# Patient Record
Sex: MALE | Race: WHITE
[De-identification: names, ages, dates, MRNs, and addresses within clinical notes are randomized per-mention and may not be internally consistent; named-entity substitution may affect disease eponyms.]

---

## 2020-01-02 LAB — HCT VFR BLD CALC: 23 % (ref 39.6–49)

## 2020-01-02 NOTE — XMS REPORT
Patient Summary Document

 Created on:2020



Patient:ELEONORA HILLMAN

Sex:Male

:1945

External Reference #:[id number]





Demographics







 Address  [address] 



   [place] [address]

 

 Home Phone  [phone]

 

 Email Address  N

 

 Preferred Language  Unknown

 

 Marital Status  Unknown

 

 Gnosticism Affiliation  Unknown

 

 Race  Unknown

 

 Additional Race(s)  Unavailable

 

 Ethnic Group  Unknown









Author







 Organization  [facility]

 

 Address  [address]



   [place] [address]

 

 Phone  [phone]









Care Team Providers







 Name  Role  Phone

 

 MARLEN LEÓN  Unavailable  Unavailable









Problems

This patient has no known problems.



Allergies, Adverse Reactions, Alerts

This patient has no known allergies or adverse reactions.



Medications

This patient has no known medications.



Results







 Test Description  Test Time  Test Comments  Text Results  Atomic Results  
Result Comments









 RAD, CHEST, 2  2019  Reason for Exam:->cad, htn,  FINAL REPORT PATIENT ID
:  



 VIEWS  08:28:00  tia, copd,  [id number] Chest, 2 views.  



     hypercholesterolemia  Clinical History: cad,  



       htn, tia, copd,  



       hypercholesterolemia  



       Comparison Study: 2016 Findings:  The  



       cardiac silhouette is  



       unremarkable. Increased  



       interstitial pulmonary  



       markings are seen. A 9 mm  



       nodular opacity is seen in  



       the right lower lung  



       field, stable from  



       previous and possibly a  



       nipple shadow. The pleural  



       spaces are clear.  



       Degenerative changes are  



       seen. Impression: No  



       significant change.  



       Signed: Elie Castro Verified  



       Date/Time:  2019  



       08:28:10 Reading Location:  



       [facility]  



       Reading Room - [place] [address]      Electronically  



       signed by: ELIE CASTRO M.D. on 2019 08:28  



       AM  









 CBC W/PLT COUNT & AUTO DIFFERENTIAL  2019 08:08:00    









   Test Item  Value  Reference Range  Comments









 WHITE BLOOD CELL COUNT (BEAKER) (test code=775)  11.4 K/ L  3.5-10.5  

 

 RED BLOOD CELL COUNT (BEAKER) (test code=761)  4.85 M/ L  4.63-6.08  

 

 HEMOGLOBIN (BEAKER) (test code=410)  13.3 GM/DL  13.7-17.5  

 

 HEMATOCRIT (BEAKER) (test code=411)  43.0 %  40.1-51.0  

 

 MEAN CORPUSCULAR VOLUME (BEAKER) (test code=753)  88.7 fL  79.0-92.2  

 

 MEAN CORPUSCULAR HEMOGLOBIN (BEAKER) (test code=751)  27.4 pg  25.7-32.2  

 

 MEAN CORPUSCULAR HEMOGLOBIN CONC (BEAKER) (test code=752)  30.9 GM/DL  32.3-
36.5  

 

 RED CELL DISTRIBUTION WIDTH (BEAKER) (test code=412)  18.0 %  11.6-14.4  

 

 PLATELET COUNT (BEAKER) (test code=756)  259 K/CU MM  150-450  

 

 MEAN PLATELET VOLUME (BEAKER) (test code=754)  10.3 fL  9.4-12.4  

 

 NUCLEATED RED BLOOD CELLS (BEAKER) (test code=413)  0 /100 WBC  0-0  

 

 NEUTROPHILS RELATIVE PERCENT (BEAKER) (test code=429)  74 %    

 

 LYMPHOCYTES RELATIVE PERCENT (BEAKER) (test code=430)  16 %    

 

 MONOCYTES RELATIVE PERCENT (BEAKER) (test code=431)  8 %    

 

 EOSINOPHILS RELATIVE PERCENT (BEAKER) (test code=432)  2 %    

 

 BASOPHILS RELATIVE PERCENT (BEAKER) (test code=437)  0 %    

 

 NEUTROPHILS ABSOLUTE COUNT (BEAKER) (test code=670)  8.48 K/ L  1.78-5.38  

 

 LYMPHOCYTES ABSOLUTE COUNT (BEAKER) (test code=414)  1.80 K/ L  1.32-3.57  

 

 MONOCYTES ABSOLUTE COUNT (BEAKER) (test code=415)  0.90 K/ L  0.30-0.82  

 

 EOSINOPHILS ABSOLUTE COUNT (BEAKER) (test code=416)  0.18 K/ L  0.04-0.54  

 

 BASOPHILS ABSOLUTE COUNT (BEAKER) (test code=417)  0.05 K/ L  0.01-0.08  

 

 IMMATURE GRANULOCYTES-RELATIVE PERCENT (BEAKER) (test  0 %  0-1  



 code=2801)      



LIPID [id number] 08:08:00





 Test Item  Value  Reference Range  Comments

 

 TRIGLYCERIDES (BEAKER) (test code=540)  143 mg/dL    

 

 CHOLESTEROL (BEAKER) (test code=631)  268 mg/dL    

 

 HDL CHOLESTEROL (BEAKER) (test code=976)  38 mg/dL    

 

 LDL CHOLESTEROL CALCULATED (BEAKER) (test  201 mg/dL    



 code=633)      



Triglyceride Reference Range:   Low Risk         &lt;150   Borderline    150-
199   High Risk     200-499   Very High Risk  &gt;=500Cholesterol Reference 
Range:   Low Risk         &lt;200   Borderline 200-239    High Risk        &gt;
240HDL Cholesterol Reference Range:   Low Risk         &gt;=60   High Risk     
    &lt;40LDL Cholesterol Reference Range:   Optimal          &lt;100   Near 
Optimal  100-129   Borderline    130-159   High          160-189   Very High   
    &gt;=190BASIC METABOLIC [id number] 08:08:00





 Test Item  Value  Reference Range  Comments

 

 SODIUM (BEAKER) (test  139 meq/L  136-145  



 yhwm=263)      

 

 POTASSIUM (BEAKER) (test  4.4 meq/L  3.5-5.1  



 code=379)      

 

 CHLORIDE (BEAKER) (test  104 meq/L    



 code=382)      

 

 CO2 (BEAKER) (test  25 meq/L  22-29  



 code=355)      

 

 BLOOD UREA NITROGEN  20 mg/dL  7-21  



 (BEAKER) (test code=354)      

 

 CREATININE (BEAKER) (test  1.53 mg/dL  0.57-1.25  



 code=358)      

 

 GLUCOSE RANDOM (BEAKER)  114 mg/dL    



 (test code=652)      

 

 CALCIUM (BEAKER) (test  9.1 mg/dL  8.4-10.2  



 code=697)      

 

 EGFR (BEAKER) (test  45 mL/min/1.73 sq m    ESTIMATED GFR IS NOT AS



 code=1092)      ACCURATE AS CREATININE



       CLEARANCE IN PREDICTING



       GLOMERULAR FILTRATION



       RATE. ESTIMATED GFR IS



       NOT APPLICABLE FOR



       DIALYSIS PATIENTS.



HEPATIC FUNCTION [id number] 08:08:00





 Test Item  Value  Reference Range  Comments

 

 TOTAL PROTEIN (BEAKER) (test code=770)  7.7 gm/dL  6.0-8.3  

 

 ALBUMIN (BEAKER) (test code=1145)  3.8 g/dL  3.5-5.0  

 

 BILIRUBIN TOTAL (BEAKER) (test code=377)  0.5 mg/dL  0.2-1.2  

 

 BILIRUBIN DIRECT (BEAKER) (test code=706)  0.2 mg/dL  0.1-0.5  

 

 ALKALINE PHOSPHATASE (BEAKER) (test code=346)  116 U/L    

 

 AST (SGOT) (BEAKER) (test code=353)  18 U/L  5-34  

 

 ALT (SGPT) (BEAKER) (test code=347)  15 U/L  6-55

## 2020-01-03 ENCOUNTER — HOSPITAL ENCOUNTER (OUTPATIENT)
Dept: HOSPITAL 97 - DS | Age: 75
Discharge: HOME | End: 2020-01-03
Attending: INTERNAL MEDICINE
Payer: COMMERCIAL

## 2020-01-03 VITALS — OXYGEN SATURATION: 96 % | DIASTOLIC BLOOD PRESSURE: 70 MMHG | SYSTOLIC BLOOD PRESSURE: 162 MMHG

## 2020-01-03 VITALS — BODY MASS INDEX: 25.8 KG/M2

## 2020-01-03 VITALS — TEMPERATURE: 99.1 F

## 2020-01-03 DIAGNOSIS — R53.83: ICD-10-CM

## 2020-01-03 DIAGNOSIS — D50.9: Primary | ICD-10-CM

## 2020-01-03 LAB — HCT VFR BLD CALC: 28.7 % (ref 39.6–49)

## 2020-01-03 PROCEDURE — 36415 COLL VENOUS BLD VENIPUNCTURE: CPT

## 2020-01-03 PROCEDURE — 85018 HEMOGLOBIN: CPT

## 2020-01-03 PROCEDURE — 86901 BLOOD TYPING SEROLOGIC RH(D): CPT

## 2020-01-03 PROCEDURE — 36430 TRANSFUSION BLD/BLD COMPNT: CPT

## 2020-01-03 PROCEDURE — 85014 HEMATOCRIT: CPT

## 2020-01-03 PROCEDURE — 86850 RBC ANTIBODY SCREEN: CPT

## 2020-01-03 PROCEDURE — 86900 BLOOD TYPING SEROLOGIC ABO: CPT

## 2020-01-03 NOTE — XMS REPORT
Patient Summary Document

 Created on:January 3, 2020



Patient:ELEONORA HILLMAN

Sex:Male

:1945

External Reference #:[id number]





Demographics







 Address  [address] 



   [place] [address]

 

 Home Phone  [phone]

 

 Email Address  N

 

 Preferred Language  Unknown

 

 Marital Status  Unknown

 

 Sabianist Affiliation  Unknown

 

 Race  Unknown

 

 Additional Race(s)  Unavailable

 

 Ethnic Group  Unknown









Author







 Organization  [facility]

 

 Address  [address]



   [place] [address]

 

 Phone  [phone]









Care Team Providers







 Name  Role  Phone

 

 MARLEN LEÓN  Unavailable  Unavailable









Problems

This patient has no known problems.



Allergies, Adverse Reactions, Alerts

This patient has no known allergies or adverse reactions.



Medications

This patient has no known medications.



Results







 Test Description  Test Time  Test Comments  Text Results  Atomic Results  
Result Comments









 RAD, CHEST, 2  2019  Reason for Exam:->cad, htn,  FINAL REPORT PATIENT ID
:  



 VIEWS  08:28:00  tia, copd,  [id number] Chest, 2 views.  



     hypercholesterolemia  Clinical History: cad,  



       htn, tia, copd,  



       hypercholesterolemia  



       Comparison Study: 2016 Findings:  The  



       cardiac silhouette is  



       unremarkable. Increased  



       interstitial pulmonary  



       markings are seen. A 9 mm  



       nodular opacity is seen in  



       the right lower lung  



       field, stable from  



       previous and possibly a  



       nipple shadow. The pleural  



       spaces are clear.  



       Degenerative changes are  



       seen. Impression: No  



       significant change.  



       Signed: Elie Castro Verified  



       Date/Time:  2019  



       08:28:10 Reading Location:  



       [facility]  



       Reading Room - [place] [address]      Electronically  



       signed by: ELIE CASTRO M.D. on 2019 08:28  



       AM  









 CBC W/PLT COUNT & AUTO DIFFERENTIAL  2019 08:08:00    









   Test Item  Value  Reference Range  Comments









 WHITE BLOOD CELL COUNT (BEAKER) (test code=775)  11.4 K/ L  3.5-10.5  

 

 RED BLOOD CELL COUNT (BEAKER) (test code=761)  4.85 M/ L  4.63-6.08  

 

 HEMOGLOBIN (BEAKER) (test code=410)  13.3 GM/DL  13.7-17.5  

 

 HEMATOCRIT (BEAKER) (test code=411)  43.0 %  40.1-51.0  

 

 MEAN CORPUSCULAR VOLUME (BEAKER) (test code=753)  88.7 fL  79.0-92.2  

 

 MEAN CORPUSCULAR HEMOGLOBIN (BEAKER) (test code=751)  27.4 pg  25.7-32.2  

 

 MEAN CORPUSCULAR HEMOGLOBIN CONC (BEAKER) (test code=752)  30.9 GM/DL  32.3-
36.5  

 

 RED CELL DISTRIBUTION WIDTH (BEAKER) (test code=412)  18.0 %  11.6-14.4  

 

 PLATELET COUNT (BEAKER) (test code=756)  259 K/CU MM  150-450  

 

 MEAN PLATELET VOLUME (BEAKER) (test code=754)  10.3 fL  9.4-12.4  

 

 NUCLEATED RED BLOOD CELLS (BEAKER) (test code=413)  0 /100 WBC  0-0  

 

 NEUTROPHILS RELATIVE PERCENT (BEAKER) (test code=429)  74 %    

 

 LYMPHOCYTES RELATIVE PERCENT (BEAKER) (test code=430)  16 %    

 

 MONOCYTES RELATIVE PERCENT (BEAKER) (test code=431)  8 %    

 

 EOSINOPHILS RELATIVE PERCENT (BEAKER) (test code=432)  2 %    

 

 BASOPHILS RELATIVE PERCENT (BEAKER) (test code=437)  0 %    

 

 NEUTROPHILS ABSOLUTE COUNT (BEAKER) (test code=670)  8.48 K/ L  1.78-5.38  

 

 LYMPHOCYTES ABSOLUTE COUNT (BEAKER) (test code=414)  1.80 K/ L  1.32-3.57  

 

 MONOCYTES ABSOLUTE COUNT (BEAKER) (test code=415)  0.90 K/ L  0.30-0.82  

 

 EOSINOPHILS ABSOLUTE COUNT (BEAKER) (test code=416)  0.18 K/ L  0.04-0.54  

 

 BASOPHILS ABSOLUTE COUNT (BEAKER) (test code=417)  0.05 K/ L  0.01-0.08  

 

 IMMATURE GRANULOCYTES-RELATIVE PERCENT (BEAKER) (test  0 %  0-1  



 code=2801)      



LIPID [id number] 08:08:00





 Test Item  Value  Reference Range  Comments

 

 TRIGLYCERIDES (BEAKER) (test code=540)  143 mg/dL    

 

 CHOLESTEROL (BEAKER) (test code=631)  268 mg/dL    

 

 HDL CHOLESTEROL (BEAKER) (test code=976)  38 mg/dL    

 

 LDL CHOLESTEROL CALCULATED (BEAKER) (test  201 mg/dL    



 code=633)      



Triglyceride Reference Range:   Low Risk         &lt;150   Borderline    150-
199   High Risk     200-499   Very High Risk  &gt;=500Cholesterol Reference 
Range:   Low Risk         &lt;200   Borderline 200-239    High Risk        &gt;
240HDL Cholesterol Reference Range:   Low Risk         &gt;=60   High Risk     
    &lt;40LDL Cholesterol Reference Range:   Optimal          &lt;100   Near 
Optimal  100-129   Borderline    130-159   High          160-189   Very High   
    &gt;=190BASIC METABOLIC [id number] 08:08:00





 Test Item  Value  Reference Range  Comments

 

 SODIUM (BEAKER) (test  139 meq/L  136-145  



 urwg=859)      

 

 POTASSIUM (BEAKER) (test  4.4 meq/L  3.5-5.1  



 code=379)      

 

 CHLORIDE (BEAKER) (test  104 meq/L    



 code=382)      

 

 CO2 (BEAKER) (test  25 meq/L  22-29  



 code=355)      

 

 BLOOD UREA NITROGEN  20 mg/dL  7-21  



 (BEAKER) (test code=354)      

 

 CREATININE (BEAKER) (test  1.53 mg/dL  0.57-1.25  



 code=358)      

 

 GLUCOSE RANDOM (BEAKER)  114 mg/dL    



 (test code=652)      

 

 CALCIUM (BEAKER) (test  9.1 mg/dL  8.4-10.2  



 code=697)      

 

 EGFR (BEAKER) (test  45 mL/min/1.73 sq m    ESTIMATED GFR IS NOT AS



 code=1092)      ACCURATE AS CREATININE



       CLEARANCE IN PREDICTING



       GLOMERULAR FILTRATION



       RATE. ESTIMATED GFR IS



       NOT APPLICABLE FOR



       DIALYSIS PATIENTS.



HEPATIC FUNCTION [id number] 08:08:00





 Test Item  Value  Reference Range  Comments

 

 TOTAL PROTEIN (BEAKER) (test code=770)  7.7 gm/dL  6.0-8.3  

 

 ALBUMIN (BEAKER) (test code=1145)  3.8 g/dL  3.5-5.0  

 

 BILIRUBIN TOTAL (BEAKER) (test code=377)  0.5 mg/dL  0.2-1.2  

 

 BILIRUBIN DIRECT (BEAKER) (test code=706)  0.2 mg/dL  0.1-0.5  

 

 ALKALINE PHOSPHATASE (BEAKER) (test code=346)  116 U/L    

 

 AST (SGOT) (BEAKER) (test code=353)  18 U/L  5-34  

 

 ALT (SGPT) (BEAKER) (test code=347)  15 U/L  6-55

## 2022-07-26 ENCOUNTER — HOSPITAL ENCOUNTER (OUTPATIENT)
Dept: HOSPITAL 97 - OR | Age: 77
Discharge: HOME | End: 2022-07-26
Attending: INTERNAL MEDICINE
Payer: COMMERCIAL

## 2022-07-26 VITALS — SYSTOLIC BLOOD PRESSURE: 141 MMHG | OXYGEN SATURATION: 94 % | DIASTOLIC BLOOD PRESSURE: 64 MMHG

## 2022-07-26 VITALS — TEMPERATURE: 98.1 F

## 2022-07-26 DIAGNOSIS — Z20.822: ICD-10-CM

## 2022-07-26 DIAGNOSIS — R04.2: Primary | ICD-10-CM

## 2022-07-26 PROCEDURE — 88108 CYTOPATH CONCENTRATE TECH: CPT

## 2022-07-26 PROCEDURE — 88305 TISSUE EXAM BY PATHOLOGIST: CPT

## 2022-07-26 PROCEDURE — 31625 BRONCHOSCOPY W/BIOPSY(S): CPT

## 2022-07-26 PROCEDURE — 71045 X-RAY EXAM CHEST 1 VIEW: CPT

## 2022-07-26 PROCEDURE — 36415 COLL VENOUS BLD VENIPUNCTURE: CPT

## 2022-07-26 PROCEDURE — 87811 SARS-COV-2 COVID19 W/OPTIC: CPT

## 2022-07-26 PROCEDURE — 0BD48ZX EXTRACTION OF RIGHT UPPER LOBE BRONCHUS, VIA NATURAL OR ARTIFICIAL OPENING ENDOSCOPIC, DIAGNOSTIC: ICD-10-PCS

## 2022-07-26 RX ADMIN — LIDOCAINE HYDROCHLORIDE ONE APPL: 40 SOLUTION TOPICAL at 11:10

## 2022-07-26 RX ADMIN — LIDOCAINE HYDROCHLORIDE ONE APPL: 40 SOLUTION TOPICAL at 11:20

## 2022-07-26 NOTE — RAD REPORT
EXAM DESCRIPTION:  RAD - Chest Single View - 7/26/2022 1:45 pm

 

CLINICAL HISTORY:  S/P BRONCHOSCOPY

 

COMPARISON:  None

 

TECHNIQUE:  AP portable chest image was obtained 7/26/2022 1:45 pm .

 

FINDINGS:  No pneumothorax or acute lung parenchymal process. Baseline interstitial pattern is promin
ent. Heart size is normal. No pleural fluid collection. No acute bony abnormality seen. No acute aort
ic findings suspected.

 

IMPRESSION:  No post bronchoscopy pneumothorax or other complication.

## 2022-07-26 NOTE — P.OP
Date of Service: 07/26/22 (Bronchoscopy with right endobronchial biopsies via 

brushings and lavage)








Findings and Operative Technique





Patient is 76 years of age evaluated by me for hemoptysis


After  obtaining informed consent from the patient he was premedicated by 

anesthesia


Findings normal vocal cord normal Teresa normal left-sided bronchial anatomy his

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 

the other subsegmental bronchi difficult to examine


Multiple endobronchial biopsies via brushings and lavage was obtained as stated 

above


Patient tolerated the procedure very well did not experience any hypotension or 

arrhythmias scheduled for a follow-up visit next week

## 2022-08-02 ENCOUNTER — HOSPITAL ENCOUNTER (INPATIENT)
Dept: HOSPITAL 97 - ER | Age: 77
LOS: 4 days | Discharge: ANOTHER HOSPITAL | DRG: 189 | End: 2022-08-06
Attending: STUDENT IN AN ORGANIZED HEALTH CARE EDUCATION/TRAINING PROGRAM | Admitting: STUDENT IN AN ORGANIZED HEALTH CARE EDUCATION/TRAINING PROGRAM
Payer: COMMERCIAL

## 2022-08-02 VITALS — BODY MASS INDEX: 25.1 KG/M2

## 2022-08-02 DIAGNOSIS — E44.0: ICD-10-CM

## 2022-08-02 DIAGNOSIS — F41.9: ICD-10-CM

## 2022-08-02 DIAGNOSIS — T39.315A: ICD-10-CM

## 2022-08-02 DIAGNOSIS — E78.5: ICD-10-CM

## 2022-08-02 DIAGNOSIS — R04.2: ICD-10-CM

## 2022-08-02 DIAGNOSIS — Z95.5: ICD-10-CM

## 2022-08-02 DIAGNOSIS — C34.01: ICD-10-CM

## 2022-08-02 DIAGNOSIS — N17.9: ICD-10-CM

## 2022-08-02 DIAGNOSIS — J18.9: ICD-10-CM

## 2022-08-02 DIAGNOSIS — I25.10: ICD-10-CM

## 2022-08-02 DIAGNOSIS — I12.9: ICD-10-CM

## 2022-08-02 DIAGNOSIS — Z87.891: ICD-10-CM

## 2022-08-02 DIAGNOSIS — C34.91: ICD-10-CM

## 2022-08-02 DIAGNOSIS — Z20.822: ICD-10-CM

## 2022-08-02 DIAGNOSIS — J96.01: Primary | ICD-10-CM

## 2022-08-02 DIAGNOSIS — N18.32: ICD-10-CM

## 2022-08-02 LAB
BUN BLD-MCNC: 25 MG/DL (ref 7–18)
COHGB MFR BLDA: 1.1 % (ref 0–1.5)
GLUCOSE SERPLBLD-MCNC: 104 MG/DL (ref 74–106)
HCT VFR BLD CALC: 40.1 % (ref 39.6–49)
INR BLD: 1.11
LYMPHOCYTES # SPEC AUTO: 1.5 K/UL (ref 0.7–4.9)
MAGNESIUM SERPL-MCNC: 2.2 MG/DL (ref 1.8–2.4)
MCV RBC: 90.4 FL (ref 80–100)
NT-PROBNP SERPL-MCNC: 543 PG/ML (ref ?–450)
OXYHGB MFR BLDA: 96.2 % (ref 94–97)
PMV BLD: 7.6 FL (ref 7.6–11.3)
POTASSIUM SERPL-SCNC: 4.2 MMOL/L (ref 3.5–5.1)
RBC # BLD: 4.44 M/UL (ref 4.33–5.43)
SAO2 % BLDA: 98.3 % (ref 92–98.5)

## 2022-08-02 PROCEDURE — 84100 ASSAY OF PHOSPHORUS: CPT

## 2022-08-02 PROCEDURE — 82550 ASSAY OF CK (CPK): CPT

## 2022-08-02 PROCEDURE — 96365 THER/PROPH/DIAG IV INF INIT: CPT

## 2022-08-02 PROCEDURE — 81001 URINALYSIS AUTO W/SCOPE: CPT

## 2022-08-02 PROCEDURE — 85730 THROMBOPLASTIN TIME PARTIAL: CPT

## 2022-08-02 PROCEDURE — 36415 COLL VENOUS BLD VENIPUNCTURE: CPT

## 2022-08-02 PROCEDURE — 76770 US EXAM ABDO BACK WALL COMP: CPT

## 2022-08-02 PROCEDURE — 87040 BLOOD CULTURE FOR BACTERIA: CPT

## 2022-08-02 PROCEDURE — 93306 TTE W/DOPPLER COMPLETE: CPT

## 2022-08-02 PROCEDURE — 82570 ASSAY OF URINE CREATININE: CPT

## 2022-08-02 PROCEDURE — 80053 COMPREHEN METABOLIC PANEL: CPT

## 2022-08-02 PROCEDURE — 84550 ASSAY OF BLOOD/URIC ACID: CPT

## 2022-08-02 PROCEDURE — 82805 BLOOD GASES W/O2 SATURATION: CPT

## 2022-08-02 PROCEDURE — 83880 ASSAY OF NATRIURETIC PEPTIDE: CPT

## 2022-08-02 PROCEDURE — 94010 BREATHING CAPACITY TEST: CPT

## 2022-08-02 PROCEDURE — 84156 ASSAY OF PROTEIN URINE: CPT

## 2022-08-02 PROCEDURE — 71045 X-RAY EXAM CHEST 1 VIEW: CPT

## 2022-08-02 PROCEDURE — 96375 TX/PRO/DX INJ NEW DRUG ADDON: CPT

## 2022-08-02 PROCEDURE — 96366 THER/PROPH/DIAG IV INF ADDON: CPT

## 2022-08-02 PROCEDURE — 71250 CT THORAX DX C-: CPT

## 2022-08-02 PROCEDURE — 83735 ASSAY OF MAGNESIUM: CPT

## 2022-08-02 PROCEDURE — 85610 PROTHROMBIN TIME: CPT

## 2022-08-02 PROCEDURE — 93005 ELECTROCARDIOGRAM TRACING: CPT

## 2022-08-02 PROCEDURE — 84300 ASSAY OF URINE SODIUM: CPT

## 2022-08-02 PROCEDURE — 99285 EMERGENCY DEPT VISIT HI MDM: CPT

## 2022-08-02 PROCEDURE — 83036 HEMOGLOBIN GLYCOSYLATED A1C: CPT

## 2022-08-02 PROCEDURE — 83970 ASSAY OF PARATHORMONE: CPT

## 2022-08-02 PROCEDURE — 80048 BASIC METABOLIC PNL TOTAL CA: CPT

## 2022-08-02 PROCEDURE — 85025 COMPLETE CBC W/AUTO DIFF WBC: CPT

## 2022-08-02 NOTE — XMS REPORT
Continuity of Care Document

                            Created on:2022



Patient:ELEONORA HILLMAN

Sex:Male

:1945

External Reference #:[id number]





Demographics







                          Address                   [address] 



                                                    [place] [address]

 

                          Home Phone                [phone]

 

                          Email Address             [email or web address]

 

                          Preferred Language        Unknown

 

                          Marital Status            Unknown

 

                          B[place]ai Affiliation     Unknown

 

                          Race                      Unknown

 

                          Additional Race(s)        Unavailable

 

                          Ethnic Group              Unknown









Author







                          Organization              [facility]

t

 

                          Address                   [address] [place] [address] [address]



                                                    [place] [address]

 

                          Phone                     [phone]









Care Team Providers







                    Name                Role                Phone

 

                    MARLEN LEÓN Attending Clinician Unavailable









Problems

This patient has no known problems.



Allergies, Adverse Reactions, Alerts

This patient has no known allergies or adverse reactions.



Medications

This patient has no known medications.



Procedures

This patient has no known procedures.



Results







           Test       Test       Test Comments Results    Result     Source



           Description Time                             Comments   

 

           RAD, CHEST, 2 2019    Reason for Exam:->cad, FINAL REPORT PATIENT 

           



           VIEWS      -25        htn, tia, copd, ID: [id number] Chest, 2          

  



                      08:28:0    hypercholesterolemia views. Clinical           

 



                      0                     History: cad, htn, tia,            



                                            copd,                 



                                            hypercholesterolemia            



                                            Comparison Study:            



                                            2016            



                                            Findings: The cardiac            



                                            silhouette is            



                                            unremarkable. Increased            



                                            interstitial pulmonary            



                                            markings are seen. A 9            



                                            mm nodular opacity is            



                                            seen in the right lower            



                                            lung field, stable from            



                                            previous and possibly a            



                                            nipple shadow. The            



                                            pleural spaces are            



                                            clear. Degenerative            



                                            changes are seen.            



                                            Impression: No            



                                            significant change.            



                                            Signed: Elie Castro            



                                            MDReport Verified            



                                            Date/Time: 2019            



                                            08:28:10 Reading            



                                            Location: [facility]            



                                            Diagnostic Imaging            



                                            Reading Room - [place] [id number] Electronically            



                                            signed by: ELIE CASTRO M.D. on            



                                            2019 08:28 AM            









                    CBC W/PLT COUNT & AUTO DIFFERENTIAL 2019 08:08:00 









                      Test Item  Value      Reference Range Interpretation Comme

nts









             WHITE BLOOD CELL COUNT (BEAKER) (test code = 775) 11.4 K/ L    3.5-

10.5     H            

 

             RED BLOOD CELL COUNT (BEAKER) (test code = 761) 4.85 M/ L    4.63-6

.08                 

 

             HEMOGLOBIN (BEAKER) (test code = 410) 13.3 GM/DL   13.7-17.5    L  

          

 

             HEMATOCRIT (BEAKER) (test code = 411) 43.0 %       40.1-51.0       

          

 

             MEAN CORPUSCULAR VOLUME (BEAKER) (test code = 753) 88.7 fL      79.

0-92.2                 

 

             MEAN CORPUSCULAR HEMOGLOBIN (BEAKER) (test code = 751) 27.4 pg     

 25.7-32.2                 

 

                    MEAN CORPUSCULAR HEMOGLOBIN CONC (BEAKER) (test code = 752) 

30.9 GM/DL          32.3-36.5

                          L                         

 

             RED CELL DISTRIBUTION WIDTH (BEAKER) (test code = 412) 18.0 %      

 11.6-14.4    H            

 

             PLATELET COUNT (BEAKER) (test code = 756) 259 K/CU MM  150-450     

              

 

             MEAN PLATELET VOLUME (BEAKER) (test code = 754) 10.3 fL      9.4-12

.4                  

 

             NUCLEATED RED BLOOD CELLS (BEAKER) (test code = 413) 0 /100 WBC   0

-0                       

 

             NEUTROPHILS RELATIVE PERCENT (BEAKER) (test code = 429) 74 %       

                            

 

             LYMPHOCYTES RELATIVE PERCENT (BEAKER) (test code = 430) 16 %       

                            

 

             MONOCYTES RELATIVE PERCENT (BEAKER) (test code = 431) 8 %          

                          

 

             EOSINOPHILS RELATIVE PERCENT (BEAKER) (test code = 432) 2 %        

                            

 

             BASOPHILS RELATIVE PERCENT (BEAKER) (test code = 437) 0 %          

                          

 

             NEUTROPHILS ABSOLUTE COUNT (BEAKER) (test code = 670) 8.48 K/ L    

1.78-5.38    H            

 

             LYMPHOCYTES ABSOLUTE COUNT (BEAKER) (test code = 414) 1.80 K/ L    

1.32-3.57                 

 

             MONOCYTES ABSOLUTE COUNT (BEAKER) (test code = 415) 0.90 K/ L    0.

30-0.82    H            

 

             EOSINOPHILS ABSOLUTE COUNT (BEAKER) (test code = 416) 0.18 K/ L    

0.04-0.54                 

 

             BASOPHILS ABSOLUTE COUNT (BEAKER) (test code = 417) 0.05 K/ L    0.

01-0.08                 

 

             IMMATURE GRANULOCYTES-RELATIVE PERCENT (BEAKER) (test code 0 %     

     0-1                       



             = 2801)                                             



LIPID [id number] 08:08:00





             Test Item    Value        Reference Range Interpretation Comments

 

             TRIGLYCERIDES (BEAKER) (test code = 143 mg/dL                      

        



             540)                                                

 

             CHOLESTEROL (BEAKER) (test code = 268 mg/dL                        

      



             631)                                                

 

             HDL CHOLESTEROL (BEAKER) (test code 38 mg/dL                       

        



             = 976)                                              

 

             LDL CHOLESTEROL CALCULATED (BEAKER) 201 mg/dL                      

        



             (test code = 633)                                        



Triglyceride Reference Range: Low Risk &lt;150 Borderline 150-199 High Risk 200-
499 Very High Risk &gt;=500Cholesterol Reference Range: Low Risk &lt;200 
Borderline 200-239 High Risk &gt;240HDL Cholesterol Reference Range: Low Risk 
&gt;=60 High Risk  &lt;40LDL Cholesterol Reference Range: Optimal &lt;100 Near 
Optimal 100-129 Borderline 130-159 High 160-189 Very High &gt;=190BASIC 
METABOLIC [id number] 08:08:00





             Test Item    Value        Reference Range Interpretation Comments

 

             SODIUM (BEAKER) 139 meq/L    136-145                   



             (test code = 381)                                        

 

             POTASSIUM (BEAKER) 4.4 meq/L    3.5-5.1                   



             (test code = 379)                                        

 

             CHLORIDE (BEAKER) 104 meq/L                        



             (test code = 382)                                        

 

             CO2 (BEAKER) (test 25 meq/L     22-29                     



             code = 355)                                         

 

             BLOOD UREA NITROGEN 20 mg/dL     7-21                      



             (BEAKER) (test code                                        



             = 354)                                              

 

             CREATININE (BEAKER) 1.53 mg/dL   0.57-1.25    H            



             (test code = 358)                                        

 

             GLUCOSE RANDOM 114 mg/dL           H            



             (BEAKER) (test code                                        



             = 652)                                              

 

             CALCIUM (BEAKER) 9.1 mg/dL    8.4-10.2                  



             (test code = 697)                                        

 

             EGFR (BEAKER) (test 45 mL/min/1.73                           ESTIMA

YASMEEN GFR IS



             code = 1092) sq m                                   NOT AS ACCURATE

 AS



                                                                 CREATININE



                                                                 CLEARANCE IN



                                                                 PREDICTING



                                                                 GLOMERULAR



                                                                 FILTRATION RATE

.



                                                                 ESTIMATED GFR I

S



                                                                 NOT APPLICABLE 

FOR



                                                                 DIALYSIS PATIEN

TS.



HEPATIC FUNCTION [id number] 08:08:00





             Test Item    Value        Reference Range Interpretation Comments

 

             TOTAL PROTEIN (BEAKER) (test code = 7.7 gm/dL    6.0-8.3           

        



             770)                                                

 

             ALBUMIN (BEAKER) (test code = 1145) 3.8 g/dL     3.5-5.0           

        

 

             BILIRUBIN TOTAL (BEAKER) (test code 0.5 mg/dL    0.2-1.2           

        



             = 377)                                              

 

             BILIRUBIN DIRECT (BEAKER) (test 0.2 mg/dL    0.1-0.5               

    



             code = 706)                                         

 

             ALKALINE PHOSPHATASE (BEAKER) (test 116 U/L                  

        



             code = 346)                                         

 

             AST (SGOT) (BEAKER) (test code = 18 U/L       5-34                 

     



             353)                                                

 

             ALT (SGPT) (TourPalAKER) (test code = 15 U/L       6-55                 

     



             347)

## 2022-08-03 LAB
ALBUMIN SERPL BCP-MCNC: 2.7 G/DL (ref 3.4–5)
ALP SERPL-CCNC: 117 U/L (ref 45–117)
ALT SERPL W P-5'-P-CCNC: 21 U/L (ref 12–78)
AST SERPL W P-5'-P-CCNC: 14 U/L (ref 15–37)
BUN BLD-MCNC: 25 MG/DL (ref 7–18)
GLUCOSE SERPLBLD-MCNC: 203 MG/DL (ref 74–106)
HCT VFR BLD CALC: 37.5 % (ref 39.6–49)
LYMPHOCYTES # SPEC AUTO: 0.3 K/UL (ref 0.7–4.9)
MCV RBC: 90 FL (ref 80–100)
MORPHOLOGY BLD-IMP: (no result)
PMV BLD: 7.7 FL (ref 7.6–11.3)
POTASSIUM SERPL-SCNC: 4.2 MMOL/L (ref 3.5–5.1)
RBC # BLD: 4.17 M/UL (ref 4.33–5.43)

## 2022-08-03 RX ADMIN — Medication SCH ML: at 20:57

## 2022-08-03 RX ADMIN — ALBUTEROL SULFATE SCH MG: 2.5 SOLUTION RESPIRATORY (INHALATION) at 20:25

## 2022-08-03 RX ADMIN — ALBUMIN (HUMAN) SCH MG: 5 SOLUTION INTRAVENOUS at 12:28

## 2022-08-03 RX ADMIN — IPRATROPIUM BROMIDE SCH MG: 0.5 SOLUTION RESPIRATORY (INHALATION) at 20:25

## 2022-08-03 RX ADMIN — IPRATROPIUM BROMIDE SCH MG: 0.5 SOLUTION RESPIRATORY (INHALATION) at 14:00

## 2022-08-03 RX ADMIN — ALBUTEROL SULFATE SCH MG: 2.5 SOLUTION RESPIRATORY (INHALATION) at 14:00

## 2022-08-03 RX ADMIN — METHYLPREDNISOLONE SODIUM SUCCINATE SCH MG: 40 INJECTION, POWDER, FOR SOLUTION INTRAMUSCULAR; INTRAVENOUS at 20:57

## 2022-08-03 RX ADMIN — Medication SCH ML: at 09:00

## 2022-08-03 NOTE — EKG
Test Date:    2022-08-02               Test Time:    20:56:22

Technician:                                        

                                                     

MEASUREMENT RESULTS:                                       

Intervals:                                           

Rate:         83                                     

AZ:           162                                    

QRSD:         82                                     

QT:           368                                    

QTc:          432                                    

Axis:                                                

P:            84                                     

AZ:           162                                    

QRS:          -23                                    

T:            38                                     

                                                     

INTERPRETIVE STATEMENTS:                                       

                                                     

Normal sinus rhythm

Nonspecific ST abnormality

Abnormal ECG

No previous ECG available for comparison



Electronically Signed On 08-03-22 07:19:17 CDT by Harsha Bhardwaj

## 2022-08-03 NOTE — EDPHYS
Physician Documentation                                                                           

 [facility]                                                                 

Name: Radha Bearden III                                                                            

Age: 77 yrs                                                                                       

Sex: Male                                                                                         

: 1945                                                                                   

MRN: [id number]                                                                                   

Arrival Date: 2022                                                                          

Time: 20:33                                                                                       

Account#: [id number]                                                                            

Bed 14                                                                                            

Private MD:                                                                                       

ED Physician Sarkis Hernandez                                                                         

HPI:                                                                                              

                                                                                             

21:50 This 77 yrs old Male presents to ER via Wheelchair with complaints of Breathing         rn  

      Difficulty.                                                                                 

21:50 The patient has shortness of breath at rest, with light activity. Onset: The            rn  

      symptoms/episode began/occurred 3 day(s) ago. Duration: The symptoms are intermittent.      

      The patient's shortness of breath is aggravated by coughing, light activity, is             

      alleviated by application of supplemental oxygen. Associated signs and symptoms:            

      Pertinent positives: non-productive cough, Pertinent negatives: fever, loss of              

      consciousness. Severity of symptoms: At their worst the symptoms were moderate in the       

      emergency department the symptoms have improved. The patient has experienced similar        

      episodes in the past. The patient has been recently seen by a physician:. Pt and family     

      member report recent diagnosis of lung cancer, had biopsy here, was doing ok but in         

      last 3 days increased SOB, initially with hemoptysis, but no longer with hemoptysis. No     

      fever. No sick contacts. No hx of dvt/PE. Still not back on anticoagulant. Not on           

      oxygen at home, but family member placed him on another person's oxygen prior to coming     

      in because was acting confused and lethargic, seemed to improve after oxygen. .             

                                                                                                  

Historical:                                                                                       

- Allergies:                                                                                      

20:46 PENICILLINS;                                                                            eh3 

- PMHx:                                                                                           

20:46 squamous cell carcinoma;                                                                eh3 

- PSHx:                                                                                           

20:46 14 heart stents;                                                                        eh3 

                                                                                                  

- Immunization history:: Client reports receiving the 2nd dose of the Covid vaccine.              

- Family history:: not pertinent.                                                                 

- Social history:: Smoking status: Patient/guardian denies using tobacco, but has a               

  distant history of tobacco abuse.                                                               

- Hospitalizations: : No recent hospitalization is reported.                                      

                                                                                                  

                                                                                                  

ROS:                                                                                              

21:50 Constitutional: Negative for fever, chills, and weight loss, Eyes: Negative for injury, rn  

      pain, redness, and discharge, Neck: Negative for injury, pain, and swelling,                

      Cardiovascular: Negative for chest pain, palpitations, and edema, Respiratory: + cough      

      and sob Abdomen/GI: Negative for abdominal pain, nausea, vomiting, diarrhea, and            

      constipation, Back: Negative for injury and pain, MS/Extremity: Negative for injury and     

      deformity, Skin: Negative for injury, rash, and discoloration, Neuro: Negative for          

      headache, numbness, tingling, and seizure.                                                  

                                                                                                  

Exam:                                                                                             

21:50 Constitutional:  This is a well developed, well nourished patient who is awake, alert,  rn  

      mild tachypnea Head/Face:  Normocephalic, atraumatic. Eyes:  Periorbital areas with no      

      swelling, redness, or edema. ENT:  dry MM, no stridor Cardiovascular:  Regular rate and     

      rhythm with a normal S1 and S2.  No gallops, murmurs, or rubs.  Normal PMI, no JVD.  No     

      pulse deficits. Respiratory:  poor inspiratory air movement, no retractions Abdomen/GI:     

       soft, non-tender, no masses Skin:  Warm, dry MS/ Extremity:  Pulses equal, no              

      cyanosis.  Neurovascular intact.  Full, normal range of motion.  Equal circumference.       

      Neuro:  Awake and alert, GCS 15, oriented to person, place, and time.                       

22:42 ECG was reviewed by the Attending Physician.                                            rn  

                                                                                                  

Vital Signs:                                                                                      

20:39  / 82; Pulse 86; Resp 18; Temp 99.0(TE); Pulse Ox 97% on 6 lpm NC; Weight 79.38   eh3 

      kg; Height 5 ft. 10 in. (177.80 cm); Pain 0/10;                                             

22:03  / 78; Pulse 89; Resp 17; Pulse Ox 93% on R/A;                                    sm5 

20:39 Body Mass Index 25.11 (79.38 kg, 177.80 cm)                                             eh3 

                                                                                                  

MDM:                                                                                              

20:34 Patient medically screened.                                                             rn  

22:19 Differential diagnosis: Chronic Obstructive Pulmonary Disease Myocardial Infarction     rn  

      pneumonia, Pneumothorax pulmonary edema, Pulmonary Embolism reactive airway disease.        

      Data reviewed: vital signs, nurses notes, lab test result(s), EKG, radiologic studies,      

      plain films, and as a result, I will admit patient. Counseling: I had a detailed            

      discussion with the patient and/or guardian regarding: the historical points, exam          

      findings, and any diagnostic results supporting the discharge/admit diagnosis, lab          

      results, radiology results, the need for further work-up and treatment in the hospital.     

      Response to treatment: the patient's symptoms have mildly improved after treatment, and     

      as a result, I will admit patient. Admission orders: after a detailed discussion of the     

      patient's condition and case, the admit orders are written by me. ED course: .              

23:38 ED course: Called Dr. Villarreal for consultation given recent evaluation and is his      rn  

      patient. No answer. .                                                                       

23:47 ED course: Dillon Xavier got a hold of Dr. Villarreal, states ok to admit here and will see  rn  

      in AM..                                                                                     

                                                                                                  

                                                                                             

20:48 Order name: BMP                                                                         rn  

                                                                                             

20:48 Order name: Blood Culture Adult (2)                                                     rn  

                                                                                             

20:48 Order name: CBC with Diff                                                               rn  

                                                                                             

20:48 Order name: Magnesium                                                                   rn  

                                                                                             

20:48 Order name: NT PRO-BNP                                                                  rn  

                                                                                             

20:48 Order name: PT-INR                                                                      rn  

                                                                                             

20:48 Order name: Ptt, Activated                                                              rn  

                                                                                             

20:49 Order name: SARS-COV-2 RT PCR (Document "Date of Onset" if Symptomatic)                 rn  

                                                                                             

21:03 Order name: ABG                                                                         rn  

                                                                                             

21:19 Order name: ABG Arterial Blood Gas; Complete Time: 21:30                                EDMS

                                                                                             

21:28 Order name: Basic Metabolic Panel; Complete Time: 22:10                                 EDMS

                                                                                             

21:28 Order name: NT PRO-BNP; Complete Time: 22:10                                            EDMS

                                                                                             

21:28 Order name: Magnesium; Complete Time: 22:10                                             EDMS

                                                                                             

21:28 Order name: CBC with Automated Diff; Complete Time: 21:50                               EDMS

                                                                                             

20:48 Order name: XRAY CXR (1 view)                                                           rn  

                                                                                             

20:53 Order name: Chest Single View; Complete Time: 22:10                                     EDMS

                                                                                             

21:28 Order name: Protime (+INR); Complete Time: 21:50                                        EDMS

                                                                                             

21:28 Order name: PTT, Activated Partial Thromb; Complete Time: 21:50                         EDMS

                                                                                             

21:28 Order name: SARS-COV-2 RT PCR; Complete Time: 22:41                                     EDMS

                                                                                             

21:28 Order name: Blood Culture                                                               EDMS

                                                                                             

21:28 Order name: Blood Culture                                                               EDMS

                                                                                             

22:08 Order name: Thorax Wo Con                                                               EDMS

                                                                                             

03:18 Order name: CBC with Automated Diff                                                     EDMS

                                                                                             

03:32 Order name: Comprehensive Metabolic Panel                                               EDMS

                                                                                             

04:51 Order name: Manual Differential                                                         EDMS

                                                                                             

11:41 Order name: Blood Culture                                                               EDMS

                                                                                             

13:24 Order name: US                                                                          EDMS

                                                                                             

20:48 Order name: EKG; Complete Time: 08:23                                                   rn  

                                                                                             

20:48 Order name: Cardiac monitoring; Complete Time: 21:11                                    rn  

                                                                                             

20:48 Order name: EKG - Nurse/Tech; Complete Time: 21:11                                      rn  

                                                                                             

20:48 Order name: IV Saline Lock; Complete Time: 20:57                                        rn  

                                                                                             

20:48 Order name: Labs collected and sent; Complete Time: 21:16                               rn  

                                                                                             

20:48 Order name: O2 Per Protocol; Complete Time: 21:12                                       rn  

                                                                                             

20:48 Order name: O2 Sat Monitoring; Complete Time: 21:12                                     rn  

                                                                                                  

EC:42 Rate is 83 beats/min. Rhythm is regular. QRS Axis is Normal. PA interval is normal. QRS rn  

      interval is normal. QT interval is normal. No Q waves. T waves are Normal. No ST            

      changes noted. Clinical impression: NSR w/ Non-specific ST/T Changes. Interpreted by        

      me. Reviewed by me.                                                                         

                                                                                                  

Administered Medications:                                                                         

21:23 Drug: SOLU-Medrol (methylPrednisoLONE) 125 mg Route: IVP; Site: right antecubital;      sm5 

22:48 Follow up: Response: No adverse reaction                                                5 

21:23 Drug: Xopenex (levalbuterol) (3) 1.25 mg Route: Inhalation;                             sm5 

22:48 Follow up: Response: No adverse reaction                                                5 

22:41 Drug: LevaQUIN (levofloxacin) 750 mg Volume: 150 ml; Route: IVPB; Infused Over: 90      sm5 

      mins; Site: right antecubital;                                                              

                                                                                             

00:30 Follow up: Response: No adverse reaction; IV Status: Completed infusion; IV Intake:     sm5 

      150ml                                                                                       

                                                                                                  

                                                                                                  

Disposition Summary:                                                                              

22 22:20                                                                                    

Hospitalization Ordered                                                                           

      Hospitalization Status: Inpatient Admission                                             rn  

      Provider: Jimmy Patel                                                                 rn  

      Condition: Stable                                                                       rn  

      Problem: new                                                                            rn  

      Symptoms: have improved                                                                 rn  

      Bed/Room Type: Standard                                                                 rn  

      Location: Telemetry/MedSurg (Inpatient)(22 14:46)                                 bd  

      Room Assignment: 224(22 14:46)                                                    bd  

      Diagnosis                                                                                   

        - COPD/ Chronic obstructive pulmonary disease with acute lower respiratory infection  rn  

        - Dyspnea, unspecified                                                                rn  

        - Hypoxemia                                                                           rn  

      Forms:                                                                                      

        - Medication Reconciliation Form                                                      rn  

        - SBAR form                                                                           rn  

Signatures:                                                                                       

Dispatcher [employer]                           EDMS                                                 

Ashley Vanessa                              bd                                                   

Radha Hicks, RN                        RN                                                      

Sarkis Hernandez MD MD rn Attema, Dillon, FNP-C                      FNP-Cla1                                                  

June Barrientos RN                        RN   5                                                  

Juliana Hawthorne                                   3                                                  

                                                                                                  

Corrections: (The following items were deleted from the chart)                                    

                                                                                             

22:08 20:53 Chest For PE Angio+CT.RAD.BRZ ordered. EDMS                                       EDMS

22:34 22:20 Telemetry/MedSurg (Inpatient) rn                                                    

: 22:20 rn                                                                                  

                                                                                             

14:46  22:34 [facility] ER HOLD [facility]  

                                                                                             

14:46  22:34 ERHOLD- [facility]  

                                                                                                  

**************************************************************************************************

## 2022-08-03 NOTE — ER
Nurse's Notes                                                                                     

 [facility]                                                                 

Name: Radha Bearden III                                                                            

Age: 77 yrs                                                                                       

Sex: Male                                                                                         

: 1945                                                                                   

MRN: [id number]                                                                                   

Arrival Date: 2022                                                                          

Time: 20:33                                                                                       

Account#: [id number]                                                                            

Bed 14                                                                                            

Private MD:                                                                                       

Diagnosis: COPD/ Chronic obstructive pulmonary disease with acute lower respiratory               

  infection;Dyspnea, unspecified;Hypoxemia                                                        

                                                                                                  

Presentation:                                                                                     

                                                                                             

20:39 Chief complaint: Patient states: SOB, coughing up phlegm and blood, waking up from      3 

      sleep gasping for air. Coronavirus screen: Vaccine status: Patient reports receiving        

      the 2nd dose of the covid vaccine. Ebola Screen: No symptoms or risks identified at         

      this time. Initial Sepsis Screen: Does the patient meet any 2 criteria? No. Patient's       

      initial sepsis screen is negative. Does the patient have a suspected source of              

      infection? No. Patient's initial sepsis screen is negative. Risk Assessment: Do you         

      want to hurt yourself or someone else? Patient reports no desire to harm self or            

      others. Onset of symptoms is unknown.                                                       

20:39 Method Of Arrival: Wheelchair                                                           [facility] 

20:39 Acuity: JEFF 3                                                                           eh3 

                                                                                                  

Historical:                                                                                       

- Allergies:                                                                                      

20:46 PENICILLINS;                                                                            eh3 

- PMHx:                                                                                           

20:46 squamous cell carcinoma;                                                                eh3 

- PSHx:                                                                                           

20:46 14 heart stents;                                                                        eh3 

                                                                                                  

- Immunization history:: Client reports receiving the 2nd dose of the Covid vaccine.              

- Family history:: not pertinent.                                                                 

- Social history:: Smoking status: Patient/guardian denies using tobacco, but has a               

  distant history of tobacco abuse.                                                               

- Hospitalizations: : No recent hospitalization is reported.                                      

                                                                                                  

                                                                                                  

Screenin:49 Abuse screen: Denies threats or abuse. Nutritional screening: No deficits noted.        sm5 

      Tuberculosis screening: No symptoms or risk factors identified. Fall Risk None              

      identified.                                                                                 

                                                                                                  

Assessment:                                                                                       

21:48 General: Appears in no apparent distress. Behavior is cooperative. Pain: Denies pain.   sm5 

      Neuro: Level of Consciousness is awake, alert, obeys commands, Oriented to person,          

      place, time, situation. Cardiovascular: Capillary refill < 3 seconds Patient's skin is      

      warm and dry. Rhythm is regular. Respiratory: Reports shortness of breath cough that is     

      Airway is patent Trachea midline Respiratory effort is even, labored, Breath sounds are     

      clear bilaterally.                                                                          

                                                                                                  

Vital Signs:                                                                                      

20:39  / 82; Pulse 86; Resp 18; Temp 99.0(TE); Pulse Ox 97% on 6 lpm NC; Weight 79.38   eh3 

      kg; Height 5 ft. 10 in. (177.80 cm); Pain 0/10;                                             

22:03  / 78; Pulse 89; Resp 17; Pulse Ox 93% on R/A;                                    sm5 

20:39 Body Mass Index 25.11 (79.38 kg, 177.80 cm)                                             3 

                                                                                                  

ED Course:                                                                                        

20:33 Patient arrived in ED.                                                                  ag3 

20:34 Sarkis Hernandez MD is Attending Physician.                                                rn  

20:46 Triage completed.                                                                       eh3 

20:48 June Barrientos RN is Primary Nurse.                                                      sm5 

21:07 EKG done, by ED staff.                                                                  wm  

21:07 Client placed on continuous cardiac and pulse oximetry monitoring. NIBP monitoring      wm  

      applied. Cardiac monitor on.                                                                

21:23 Inserted saline lock: 20 gauge in right antecubital area, using aseptic technique.      sm5 

      Blood collected.                                                                            

21:42 Chest Single View In Process Unspecified.                                               EDMS

21:49 Arm band placed on right wrist.                                                         sm5 

21:49 Patient has correct armband on for positive identification. Bed in low position. Call   sm5 

      light in reach. Side rails up X2.                                                           

22:19 Jimmy Patel MD is Hospitalizing Provider.                                            rn  

22:26 Thorax Wo Con In Process Unspecified.                                                   EDMS

08/03                                                                                             

07:46 Primary Nurse role handed off by June Barrientos, ANA                                       bd  

10:46 Lety Merlos, RN is Primary Nurse.                                                     ll1 

                                                                                                  

Administered Medications:                                                                         

                                                                                             

21:23 Drug: SOLU-Medrol (methylPrednisoLONE) 125 mg Route: IVP; Site: right antecubital;      sm5 

22:48 Follow up: Response: No adverse reaction                                                sm5 

21:23 Drug: Xopenex (levalbuterol) (3) 1.25 mg Route: Inhalation;                             sm5 

22:48 Follow up: Response: No adverse reaction                                                5 

22:41 Drug: LevaQUIN (levofloxacin) 750 mg Volume: 150 ml; Route: IVPB; Infused Over: 90      sm5 

      mins; Site: right antecubital;                                                              

                                                                                             

00:30 Follow up: Response: No adverse reaction; IV Status: Completed infusion; IV Intake:     sm5 

      150ml                                                                                       

                                                                                                  

                                                                                                  

Medication:                                                                                       

                                                                                             

21:49 VIS not applicable for this client.                                                     sm5 

                                                                                                  

Intake:                                                                                           

                                                                                             

00:30 IV: 150ml; Total: 150ml.                                                                sm5 

                                                                                                  

Outcome:                                                                                          

                                                                                             

22:20 Decision to Hospitalize by Provider.                                                    rn  

                                                                                             

15:27 Patient left the ED.                                                                    el3 

                                                                                                  

Signatures:                                                                                       

Dispatcher [employer]                           EDMS                                                 

Vanessa Ramirez Roman, MD MD rn Gomez, Alice                                 3                                                  

Lety Merlos RN RN   1                                                  

Nusrat Wilson                                                                                    

June Barrientos RN RN   5                                                  

Juliana Hawthorne                                   3                                                  

Jennifer Roque RN RN   kr3                                                  

                                                                                                  

Corrections: (The following items were deleted from the chart)                                    

                                                                                             

20:46 20:35 Chief complaint: 3                                                              3 

                                                                                                  

**************************************************************************************************

## 2022-08-03 NOTE — P.CNS
Date of Consult: 08/03/22


Reason for Consult: Respiratory distress lung cancer


Chief Complaint: Shortness of breath


History of Present Illness: 


Patient is 77 years of age recently diagnosed with squamous cell cancer he has 

90% occlusion of his right and right mainstem bronchus and having significant 

shortness of breath at home, progressively worse over the past 2 weeks he was 

scheduled to follow-up at [facility] he became worse came here to the emergency

room he has had significant hemoptysis for the past 2 weeks his Plavix and 

aspirin were all stopped not coughing up blood anymore is very short of breath


Allergies





Penicillins Allergy (Verified 07/26/22 11:36)


   Rash





Home Medications: 








Aspirin Chewable [Aspirin Chewable*] 81 mg PO DAILY 01/03/20 


Atorvastatin Calcium [Lipitor] 80 mg PO BEDTIME 01/03/20 


Clopidogrel Bisulfate [Plavix] 75 mg PO DAILY 01/03/20 


Ezetimibe [Zetia] 10 mg PO DAILY 01/03/20 


Lisinopril [Zestril] 2.5 mg PO DAILY 01/03/20 


Albuterol Sulfate [Proair Hfa] 2 puff IH PRN PRN 07/21/22 


Allopurinol 300 mg PO DAILY 07/21/22 


Amlodipine Besylate [Norvasc] 2.5 mg PO DAILY 07/21/22 


Budesonide/Glycopyr/Formoterol [Breztri Aerosphere Inhaler] 2 puff IH BID 

07/21/22 


Cetirizine HCl [Zyrtec] 10 mg PO DAILY 07/21/22 


Guaifenesin [Mucinex] 1,200 mg PO DAILY 07/21/22 


Tamsulosin HCl [Flomax] 0.4 mg PO BEDTIME 07/21/22 








- Past Medical/Surgical History


-: Lung cancer


-: CAD


-: Hypertension


-: Hyperlipidemia


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


-: None


Psychosocial/ Personal History: Patient lives at home with his wife





- Family History


  ** Father


Medical History: Heart disease





- Social History


Alcohol use: No


CD- Drugs: No


Caffeine use: Yes


Place of Residence: Home





Review of Systems


10-point ROS is otherwise unremarkable


General: Weakness


Respiratory: Cough, Shortness of Breath





Physical Examination














Temp Pulse Resp BP Pulse Ox


 


 97.8 F   80   24 H  159/95 H  94 


 


 08/03/22 07:24  08/03/22 07:24  08/03/22 07:24  08/03/22 07:24  08/03/22 07:24








General: Alert, Oriented x3, Mild distress


Respiratory: Expiratory wheezes


Cardiovascular: No edema, Regular rate/rhythm, Normal S1 S2


Gastrointestinal: Normal bowel sounds, Soft and benign, Non-distended


Musculoskeletal: No clubbing, No swelling


Laboratory Data (last 24 hrs)





08/02/22 20:52: PT 12.2, INR 1.11, APTT 28.3


08/02/22 20:52: WBC 15.1 H, Hgb 13.0 L, Hct 40.1, Plt Count 289


08/02/22 20:52: Sodium 140, Potassium 4.2, BUN 25 H, Creatinine 1.83 H, Glucose 

104, Magnesium 2.2


08/02/22 20:48: PT Cancelled, INR Cancelled, APTT Cancelled


08/02/22 20:48: WBC Cancelled, Hgb Cancelled, Hct Cancelled, Plt Count Cancelled


08/02/22 20:48: Sodium Cancelled, Potassium Cancelled, BUN Cancelled, Creatinine

 Cancelled, Glucose Cancelled, Magnesium Cancelled








- Problems


(1) Lung cancer


Current Visit: Yes   Status: Acute   


Plan: 


Patient is 77 years of age with a 90% occlusion of his right mainstem bronchus 

gnosis of squamous cell cancer done by bronchoscopy last week he has been having

 progressive shortness of breath also experienced some hemoptysis Plavix and 

aspirin stopped his wife brought him to the emergency room he still very short 

of breath patchy changes on the left side mildly elevated white count also has 

renal failure blood gases reviewed to do scheduled bronchodilators reduce the 

dose of Solu-Medrol changed to p.o. levofloxacin DC IV fluids follow some Lasix 

echo with Doppler commend transfer to a tertiary care facility for a stent 

placement need further staging of his lung cancer patient has failed therapy at 

home with bronchodilators


Qualifiers: 


   Laterality: right

## 2022-08-04 LAB
ALBUMIN SERPL BCP-MCNC: 2.6 G/DL (ref 3.4–5)
ALP SERPL-CCNC: 115 U/L (ref 45–117)
ALT SERPL W P-5'-P-CCNC: 25 U/L (ref 12–78)
AST SERPL W P-5'-P-CCNC: 17 U/L (ref 15–37)
BUN BLD-MCNC: 38 MG/DL (ref 7–18)
GLUCOSE SERPLBLD-MCNC: 167 MG/DL (ref 74–106)
HCT VFR BLD CALC: 37.9 % (ref 39.6–49)
LYMPHOCYTES # SPEC AUTO: 0.6 K/UL (ref 0.7–4.9)
MCV RBC: 90.3 FL (ref 80–100)
MORPHOLOGY BLD-IMP: (no result)
PMV BLD: 7.9 FL (ref 7.6–11.3)
POTASSIUM SERPL-SCNC: 3.9 MMOL/L (ref 3.5–5.1)
RBC # BLD: 4.2 M/UL (ref 4.33–5.43)
URATE SERPL-MCNC: 7.1 MG/DL (ref 3.5–7.2)

## 2022-08-04 RX ADMIN — IPRATROPIUM BROMIDE SCH MG: 0.5 SOLUTION RESPIRATORY (INHALATION) at 01:10

## 2022-08-04 RX ADMIN — ALBUTEROL SULFATE SCH MG: 2.5 SOLUTION RESPIRATORY (INHALATION) at 06:00

## 2022-08-04 RX ADMIN — IPRATROPIUM BROMIDE SCH MG: 0.5 SOLUTION RESPIRATORY (INHALATION) at 20:00

## 2022-08-04 RX ADMIN — HYDRALAZINE HYDROCHLORIDE PRN MG: 20 INJECTION INTRAMUSCULAR; INTRAVENOUS at 21:26

## 2022-08-04 RX ADMIN — IPRATROPIUM BROMIDE SCH MG: 0.5 SOLUTION RESPIRATORY (INHALATION) at 06:00

## 2022-08-04 RX ADMIN — AMLODIPINE BESYLATE SCH MG: 5 TABLET ORAL at 13:19

## 2022-08-04 RX ADMIN — ALBUTEROL SULFATE SCH MG: 2.5 SOLUTION RESPIRATORY (INHALATION) at 01:10

## 2022-08-04 RX ADMIN — METHYLPREDNISOLONE SODIUM SUCCINATE SCH MG: 40 INJECTION, POWDER, FOR SOLUTION INTRAMUSCULAR; INTRAVENOUS at 09:00

## 2022-08-04 RX ADMIN — Medication SCH ML: at 09:00

## 2022-08-04 RX ADMIN — ALBUMIN (HUMAN) SCH MG: 5 SOLUTION INTRAVENOUS at 09:00

## 2022-08-04 RX ADMIN — IPRATROPIUM BROMIDE SCH MG: 0.5 SOLUTION RESPIRATORY (INHALATION) at 14:00

## 2022-08-04 RX ADMIN — ALBUTEROL SULFATE SCH MG: 2.5 SOLUTION RESPIRATORY (INHALATION) at 14:00

## 2022-08-04 RX ADMIN — ALBUTEROL SULFATE SCH MG: 2.5 SOLUTION RESPIRATORY (INHALATION) at 20:00

## 2022-08-04 RX ADMIN — Medication SCH ML: at 21:26

## 2022-08-04 NOTE — P.PN
Date of Service: 08/04/22





Subjective:


no significant change, stable on 2L NC


dyspneic with movement


didn't sleep much





ROS:


as noted above, otherwise 10 point ROS negative








Physical Exam:


Gen: fatigued, aox3


CV: regular rate/rhythm, no edema


Pulm: diminished with crackles at R base, clear on left


Abd: soft, nontender,nondistended


Neuro: moves all extremities, normal affect





Problem List:


acute hypoxic respiratory failure secondary to 90% narrrowing of R maintsem 

bronchus


Right lower lobe pneumonia, post-obstructive


CHRISTIAN on CKD3


Hypertension


Hyperlipidemia





cultures negative so far


on levaquin, has PCN allergy


pulm consulted, continue steroids, wean o2


pulm recommended transfer to tertiary care center for stent placement in R 

bronchus


mass has advanced in short amount of time


s/p bronch / biopsy: poorly differentiated NSCLC with squamous characteristics


renal function stable, reports recent GFR: 40, christian on ckd


nephro consulted


resume antihypertensives


patient's aspirin/plavix on hold for ~2 weeks now, had hemoptysis and stopped 

perioperatively, 


continue home meds





Code: full


Dispo: transfer initiated


no beds anywhere, awaiting open bed at [facility]





Time Spent Managing Pts Care (In Minutes): 35

## 2022-08-04 NOTE — P.PN
Subjective


Date of Service: 08/04/22


Chief Complaint: Shortness of breath





Patient's condition he still has some shortness of breath dyspneic on mild to 

moderate exertion





Review of Systems


General: Weakness


Respiratory: Shortness of Breath





Physical Examination





- Vital Signs


Temperature: 97.4 F


Blood Pressure: 168/73


Pulse: 84


Respirations: 18


Pulse Ox (%): 96





- Physical Exam


General: Alert, In no apparent distress, Mild distress


Respiratory: Diminished (Many she had entry more prominent on the right side)


Cardiovascular: No edema, Regular rate/rhythm





Assessment And Plan





- Current Problems (Diagnosis)


(1) Lung cancer


Current Visit: Yes   Status: Acute   


Plan: 


Patient has significant occlusion of the right mainstem bronchus awaiting 

transfer to a tertiary care facility for a stent placement also consulted Dr. Tigist figueroa for an outpatient treatment with radiation patient has chronic renal

failure no response to IV Lasix ultrasound of the kidney chronic renal disease 

some cysts noted White count is elevated to be from steroid DC IV steroids 

changed to p.o. prednisone continue with p.o. antibiotics DC IV Lasix continue 

with nebulizer pressures little elevated


Qualifiers: 


   Laterality: right

## 2022-08-04 NOTE — P.CNS
Date of Consult: 08/04/22


Reason for Consult: CKD III


Requesting Physician: David Hernandez


Chief Complaint: Shortness of breath


History of Present Illness: 





77-year-old male with history of CAD, COPD, hypertension, hyperlipidemia who was

recently diagnosed with non-small cell carcinoma/lung cancer after bronchoscopy 

on 7/26/2022 performed by local pulmonology.  During the bronchoscopy it was 

noted that patient had a 90% occluded right mainstem.  Patient developed 

worsening shortness of breath over the course of the last day or 2 his wife 

reports coming home and finding him dyspneic, tachypneic and altered they had 

some oxygen at home from his brother which was applied to the patient, his 

symptoms improved over the course the next hour.  He was brought to the 

emergency department for further evaluations his labs were significant for 

leukocytosis, renal insufficiency unknown renal function.  With direct invasion 

of the right mainstem bronchus which is focally narrowed concerning for primary 

pulmonary neoplasm.  New airspace infiltrates in the right lower lobe and 

scattered ill-defined nodse was discussed with pulmonology over the phone while 

patient was in the emergency department and recommends admission, IV steroids, 

baseline antibiotiules in the left lower lobe could reflect hemorrhage, 

infection or endobronchial spread of tumor.  Only sirs criteria met is for 

leukocytosis patient not septic at this time.  Will admit for further evaluation

and management.





21:50 This 77 yrs old Male presents to ER via Wheelchair with complaints of 

Breathing         rn  


      Difficulty.                                                               

                 


21:50 The patient has shortness of breath at rest, with light activity. Onset: 

The            rn  


      symptoms/episode began/occurred 3 day(s) ago. Duration: The symptoms are 

intermittent.      


      The patient's shortness of breath is aggravated by coughing, light 

activity, is             


      alleviated by application of supplemental oxygen. Associated signs and 

symptoms:            


      Pertinent positives: non-productive cough, Pertinent negatives: fever, 

loss of              


      consciousness. Severity of symptoms: At their worst the symptoms were m

oderate in the       


      emergency department the symptoms have improved. The patient has 

experienced similar        


      episodes in the past. The patient has been recently seen by a physician:. 

Pt and family     


      member report recent diagnosis of lung cancer, had biopsy here, was doing 

ok but in         


      last 3 days increased SOB, initially with hemoptysis, but no longer with 

hemoptysis. No     


      fever. No sick contacts. No hx of dvt/PE. Still not back on anticoagulant.

Not on           


      oxygen at home, but family member placed him on another person's oxygen 

prior to coming     


      in because was acting confused and lethargic, seemed to improve after 

oxygen. 


Allergies





Penicillins Allergy (Verified 07/26/22 11:36)


   Rash





Home medications list reviewed: Yes


Home Medications: 








Aspirin Chewable [Aspirin Chewable*] 81 mg PO DAILY 01/03/20 


Atorvastatin Calcium [Lipitor] 80 mg PO BEDTIME 01/03/20 


Ezetimibe [Zetia] 10 mg PO DAILY 01/03/20 


Lisinopril [Zestril] 2.5 mg PO DAILY 01/03/20 


Albuterol Sulfate [Proair Hfa] 2 puff IH PRN PRN 07/21/22 


Amlodipine Besylate [Norvasc] 2.5 mg PO DAILY 07/21/22 


Budesonide/Glycopyr/Formoterol [Breztri Aerosphere Inhaler] 2 puff IH BID 

07/21/22 


Cetirizine HCl [Zyrtec] 10 mg PO DAILY 07/21/22 


Guaifenesin [Mucinex] 1,200 mg PO DAILY 07/21/22 


Tamsulosin HCl [Flomax] 0.4 mg PO BEDTIME 07/21/22 








- Past Medical/Surgical History


-: Lung cancer


-: CAD


-: Hypertension


-: Hyperlipidemia


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


-: CKD III followed by Dr. Landry


-: None


Psychosocial/ Personal History: Patient lives at home with his wife





- Family History


  ** Father


Medical History: Heart disease





- Social History


Smoking Status: Former smoker


Alcohol use: No


CD- Drugs: No


Caffeine use: Yes


Place of Residence: Home





Review of Systems


10-point ROS is otherwise unremarkable


General: Weakness


Respiratory: SOB with Excertion





Physical Examination














Temp Pulse Resp BP Pulse Ox


 


 98.1 F   88   20   141/69 H  94 


 


 08/04/22 16:00  08/04/22 16:00  08/04/22 16:00  08/04/22 16:00  08/04/22 16:00








General: Oriented x3, Cooperative


HEENT: Atraumatic


Neck: Supple


Cardiovascular: No edema, Regular rate/rhythm


Gastrointestinal: Non-distended


Musculoskeletal: No clubbing, No contractures


Integumentary: No rashes, No cyanosis


Neurological: Normal speech


Blood work reviewed in the chart.


Imagings Data: 


EXAM DESCRIPTION:  CT - Thorax Wo Con - 8/3/2022 6:27 am


CLINICAL HISTORY:  77 years   Male   cough, hemoptysis, dyspnea


TECHNIQUE:  Contiguous axial images obtained through the chest without IV 

contrast administration.   Coronal and sagittal reformatted images provided.


This CT exam was performed according to our departmental dose-optimization 

program, which includes one or more of the following dose reduction techniques: 

automated exposure control, adjustment of the mA and/or kV according to patient 

size, and/or use of iterative reconstruction technique.


COMPARISON:  7/8/2022


FINDINGS:  Again seen is moderate diffuse emphysema. Also again seen is a right 

suprahilar mass which has increased in size since the prior exam, measuring 5.1 

x 6.5 x 5.1 cm where it previously measured 4.5 x 5.8 x 4.0 cm.


Again seen is direct invasion of the right mainstem bronchus. This appears more 

pronounced than on the prior exam. There is now focal moderate to severe 

narrowing of the right mainstem bronchus due to this mass. Secretions versus 

blood products noted in the right lower lobe airways. There are scattered ill-

defined nodules in the left lower lobe with more confluent airspace infiltrates 

in the right lower lobe, new since the prior exam.


Again seen are borderline mediastinal lymph nodes. The heart is normal in size 

without pericardial effusion. Atherosclerosis without thoracic aortic aneurysm.


No visualized acute upper abdominal or osseous abnormality.


IMPRESSION:  Right suprahilar mass has increased in size since the prior exam, 

currently measuring up to 6.5 cm. There is direct invasion of the right mainstem

 bronchus, which is focally narrowed. Findings are again consistent with a 

primary pulmonary neoplasm.


New airspace infiltrates in the right lower lobe and scattered ill-defined 

nodules in the left lower lobe could reflect hemorrhage, infection, or 

endobronchial spread of tumor.








EXAM DESCRIPTION:  US - Renal Ultrasound-Complete - 8/3/2022 1:12 pm


CLINICAL HISTORY:  renal failure


COMPARISON:  No comparisons


FINDINGS:  The right kidney measures 9.7 x 5.5 x 4.1 cm.  The left kidney 

measures 10.0 x 5.0 x 4.3 cm. Left renal cortex appears slightly thinned 

relative to the right but probably still falls within the range of normal. There

 is an increase in renal cortical echogenicity typical for medical renal 

disease. No hydronephrosis or suspicious renal mass. A 1.7 centimeter thin-

walled homogeneous anechoic to hypoechoic exophytic cysts present lateral lower 

pole right kidney a 1.6 centimeter thin-walled anechoic exophytic cyst is pre

sent upper pole right kidney. A 4 mm nonshadowing hyperechoic focus present in 

the lower central right renal hilum could be nonshadowing, nonobstructing stone,

 prominent hilum fat or a small angiomyolipoma. This is not regarded as 

significant.


No bladder wall thickening or mass. No intraluminal stone or mass.


IMPRESSION:  No hydronephrosis or suspicious mass of either kidney. Increased 

cortical echogenicity is present typical for medical renal disease.


Two benign-appearing exophytic right renal cysts are present largest at 1.7 cm.


No other significant findings.








EXAM DESCRIPTION:  RAD - Chest Single View - 8/2/2022 9:41 pm


CLINICAL HISTORY:  dyspnea


Chest pain.


COMPARISON:  Chest Single View dated 7/26/2022; Thorax Wo Con dated 7/8/2022; 

Small Bowel Series dated 2/18/2020


FINDINGS:  Portable technique limits examination quality.


3 cm right parahilar mass lesion is again noted. Bilateral interstitial 

prominence is seen with increased basilar lung markings present. This may 

represent mild pulmonary edema or a viral infection. The heart is mildly 

enlarged in size.


Conclusions/Impression: 





CKD III with proteinuria


-No NSAIDs





HTN with CKD


-Continue Amlodipine





Hyperglycemia in the setting of prednisone


-Check A1C





Moderate malnutrition in the setting of NSCLC


-Encourage nutrition





Anemia in chronic illness


-Monitor H&H





CKD MBD   


-Check PO4








Thank you kindly for the referral

## 2022-08-04 NOTE — CON
Date of Consultation:  08/04/2022



Reason For Consultation:  Elevated BUN and creatinine, fluid management.



History Of Present Illness:  This is a pleasant 77-year-old gentleman with 
significant past medical history of CAD, COPD, hypertension, hyperlipidemia, 
lung small cell CA, chronic kidney disease, baseline creatinine as by the 
patient, GFR around 40.  The patient came to the hospital because of cough, 
shortness of breath, found to have elevation in BUN and creatinine.  For that 
reason, we have been consulted.  The patient admits that he has been taking 
Aleve for the last 2 weeks 800 mg daily.  The patient denied any fever.



Past Medical History:  includes;

1.   Coronary artery disease.

2.   COPD.

3.   Hypertension.

4.   Hyperlipidemia.

5.   Lung CA.

6.   Chronic kidney disease, stage 3B Secondary to hypertension 
nephrosclerosis/BRIANDA secondary to nonsteroidal use.  The patient follows up with 
Dr. Landry at [place].  According to the patient, saw her 1 month ago, at 
that time has GFR around 40.



Past Surgical History:  Includes bronchoscopy.



Social History:  Ex-smoker.  Denied alcohol.  Denied drugs abuse.



Home Medications:  Include aspirin, Aleve, Zetia, lisinopril, allopurinol, 
amlodipine, cetirizine, guaifenesin, and Flomax.



Physical Examination:

Vital Signs:  When I saw the patient; blood pressure 146/92, pulse of 92. 

Chest:  Decreased entry right side. 

Heart:  S1, S2.  Systolic murmur. 

Abdomen:  Soft, nontender. 

Extremities:  No edema. 

Neuro:  Alert.  No focality.



Laboratory Data:  Sodium 140, potassium 3.9, bicarb 27, BUN 38, creatinine 1.9, 
GFR of 36, calcium 8.9.  .  Urinalysis negative for infection.  WBC 24.8,
H and H 12.6/37.9.



Current Medications:  The patient on include;

1.   Levaquin.

2.   Tylenol.

3.   Lasix 40 daily.

4.   Zofran.

5.   Breathing treatment.



Assessment And Plan:  

1.   Acute kidney injury secondary to nonsteroidal use.  Hold Aleve and we will 
monitor.

2.   Chronic kidney disease stage 3B with acute kidney injury as above.  The 
patient follows up with Dr. Landry.  We will inform his primary nephrologist 
to follow up with him.

3.   Hypertension, controlled, optimal.  Continue current medication.

4.   Coronary artery disease with congestive heart failure.  Continue current 
Lasix dose.

5.   Lung cancer as by primary.

6.   Obstructive pneumonia as by primary.

7.   Hypertension, controlled, optimal.  Continue current treatment.



Time spent examining the patient face-to-face, reviewing the data, placing 
order, discussing with  by bedside, discussing with staff member 
including charge nurse and nursing and hospitalist 35 minutes.

MARVIN

DD:  08/04/2022 11:56:42   Voice ID:  [id number]

DT:  08/04/2022 16:01:50   Report ID:  [id number]

MTDD

## 2022-08-05 LAB
ALBUMIN SERPL BCP-MCNC: 2.7 G/DL (ref 3.4–5)
ALP SERPL-CCNC: 113 U/L (ref 45–117)
ALT SERPL W P-5'-P-CCNC: 37 U/L (ref 12–78)
AST SERPL W P-5'-P-CCNC: 26 U/L (ref 15–37)
BUN BLD-MCNC: 42 MG/DL (ref 7–18)
CREAT UR-SCNC: 122 MG/DL (ref 20–370)
GLUCOSE SERPLBLD-MCNC: 148 MG/DL (ref 74–106)
HCT VFR BLD CALC: 40.3 % (ref 39.6–49)
LYMPHOCYTES # SPEC AUTO: 0.9 K/UL (ref 0.7–4.9)
MAGNESIUM SERPL-MCNC: 2.2 MG/DL (ref 1.8–2.4)
MCV RBC: 90.2 FL (ref 80–100)
PMV BLD: 7.8 FL (ref 7.6–11.3)
POTASSIUM SERPL-SCNC: 4.1 MMOL/L (ref 3.5–5.1)
PROT UR-MCNC: 37.6 MG/DL (ref ?–11.9)
RBC # BLD: 4.46 M/UL (ref 4.33–5.43)
URATE SERPL-MCNC: 7 MG/DL (ref 3.5–7.2)

## 2022-08-05 RX ADMIN — ALBUTEROL SULFATE SCH MG: 2.5 SOLUTION RESPIRATORY (INHALATION) at 14:00

## 2022-08-05 RX ADMIN — ALBUTEROL SULFATE SCH MG: 2.5 SOLUTION RESPIRATORY (INHALATION) at 08:00

## 2022-08-05 RX ADMIN — IPRATROPIUM BROMIDE SCH MG: 0.5 SOLUTION RESPIRATORY (INHALATION) at 14:00

## 2022-08-05 RX ADMIN — IPRATROPIUM BROMIDE SCH MG: 0.5 SOLUTION RESPIRATORY (INHALATION) at 01:45

## 2022-08-05 RX ADMIN — ALBUTEROL SULFATE SCH MG: 2.5 SOLUTION RESPIRATORY (INHALATION) at 01:45

## 2022-08-05 RX ADMIN — HYDRALAZINE HYDROCHLORIDE PRN MG: 20 INJECTION INTRAMUSCULAR; INTRAVENOUS at 21:36

## 2022-08-05 RX ADMIN — Medication SCH ML: at 07:46

## 2022-08-05 RX ADMIN — HYDRALAZINE HYDROCHLORIDE PRN MG: 20 INJECTION INTRAMUSCULAR; INTRAVENOUS at 07:46

## 2022-08-05 RX ADMIN — IPRATROPIUM BROMIDE SCH MG: 0.5 SOLUTION RESPIRATORY (INHALATION) at 08:00

## 2022-08-05 RX ADMIN — Medication SCH ML: at 21:38

## 2022-08-05 RX ADMIN — AMLODIPINE BESYLATE SCH MG: 5 TABLET ORAL at 07:45

## 2022-08-05 RX ADMIN — IPRATROPIUM BROMIDE SCH MG: 0.5 SOLUTION RESPIRATORY (INHALATION) at 19:50

## 2022-08-05 RX ADMIN — ALBUTEROL SULFATE SCH MG: 2.5 SOLUTION RESPIRATORY (INHALATION) at 19:50

## 2022-08-05 NOTE — P.PN
Date of Service: 08/05/22





Subjective:


stable


didn't sleep well last night, didn't take xanax


hoarse voice / dry





ROS:


as noted above, otherwise 10 point ROS negative





Physical Exam:


Gen: fatigued, aox3


CV: regular rate/rhythm, no edema


Pulm: diminished with crackles at R base, clear on left


Abd: soft, nontender, nondistended


Neuro: moves all extremities, normal affect





Problem List:


acute hypoxic respiratory failure secondary to 90% narrrowing of R maintsem 

bronchus


Right lower lobe pneumonia, post-obstructive


CHRISTIAN on CKD3


Hypertension


Hyperlipidemia





cultures negative so far


on levaquin, has PCN allergy


pulm consulted, continue steroids, wean o2


pulm recommended transfer to tertiary care center for stent placement in R 

bronchus


mass has advanced in short amount of time


s/p bronch / biopsy: poorly differentiated NSCLC with squamous characteristics


renal function stable, reports recent GFR: 40, christian on ckd


nephro consulted


resume antihypertensives


patient's aspirin/plavix on hold for ~2 weeks now, had hemoptysis and stopped 

perioperatively, 


continue home meds


resume home xanax at bedtime





Code: full


Dispo: transfer initiated


accepted, pending bed





Time Spent Managing Pts Care (In Minutes): 35

## 2022-08-06 VITALS — SYSTOLIC BLOOD PRESSURE: 127 MMHG | DIASTOLIC BLOOD PRESSURE: 70 MMHG | TEMPERATURE: 96.9 F

## 2022-08-06 VITALS — OXYGEN SATURATION: 97 %

## 2022-08-06 RX ADMIN — ALBUTEROL SULFATE SCH MG: 2.5 SOLUTION RESPIRATORY (INHALATION) at 01:40

## 2022-08-06 RX ADMIN — IPRATROPIUM BROMIDE SCH MG: 0.5 SOLUTION RESPIRATORY (INHALATION) at 08:22

## 2022-08-06 RX ADMIN — Medication SCH ML: at 07:57

## 2022-08-06 RX ADMIN — IPRATROPIUM BROMIDE SCH MG: 0.5 SOLUTION RESPIRATORY (INHALATION) at 01:40

## 2022-08-06 RX ADMIN — IPRATROPIUM BROMIDE SCH MG: 0.5 SOLUTION RESPIRATORY (INHALATION) at 14:28

## 2022-08-06 RX ADMIN — HYDRALAZINE HYDROCHLORIDE PRN MG: 20 INJECTION INTRAMUSCULAR; INTRAVENOUS at 09:14

## 2022-08-06 RX ADMIN — ALBUTEROL SULFATE SCH MG: 2.5 SOLUTION RESPIRATORY (INHALATION) at 08:22

## 2022-08-06 RX ADMIN — ALBUTEROL SULFATE SCH MG: 2.5 SOLUTION RESPIRATORY (INHALATION) at 14:28

## 2022-08-06 NOTE — P.PN
Date of Service: 08/06/22





Subjective:











ROS:


as noted above, otherwise 10 point ROS negative





Physical Exam:


Gen: fatigued, aox3


CV: regular rate/rhythm, no edema


Pulm: diminished with crackles at R base, clear on left


Abd: soft, nontender, nondistended


Neuro: moves all extremities, normal affect





Problem List:


acute hypoxic respiratory failure secondary to 90% narrrowing of R maintsem 

bronchus


Right lower lobe pneumonia, post-obstructive


CHRISTIAN on CKD3


Hypertension


Hyperlipidemia





cultures negative so far


on levaquin, has PCN allergy


pulm consulted, continue steroids, wean o2


pulm recommended transfer to tertiary care center for stent placement in R [facility]


mass has advanced in short amount of time


s/p bronch / biopsy: poorly differentiated NSCLC with squamous characteristics


renal function stable, reports recent GFR: 40, christian on ckd


nephro consulted


resume antihypertensives


patient's aspirin/plavix on hold for ~2 weeks now, had hemoptysis and stopped 

perioperatively, 


continue home meds


resume home xanax at bedtime





Code: full


Dispo: transfer initiated


accepted, pending bed





Time Spent Managing Pts Care (In Minutes): 35

## 2022-08-06 NOTE — P.DS
Admission Date: 08/03/22


Discharge Date: 08/06/22


Disposition: TRANSFER TO [facility]


Reason for Admission: Shortness of breath


Consultations: 





Pulmonology - Dr. Villarreal





Procedures: 





CT Chest (8/2): 


FINDINGS:  Again seen is moderate diffuse emphysema. Also again seen is a right 

suprahilar mass which has increased in size since the prior exam, measuring 5.1 

x 6.5 x 5.1 cm where it previously measured 4.5 x 5.8 x 4.0 cm.


Again seen is direct invasion of the right mainstem bronchus. This appears more 

pronounced than on the prior exam. There is now focal moderate to severe 

narrowing of the right mainstem bronchus due to this mass. Secretions versus 

blood products noted in the right lower lobe airways. There are scattered ill-

defined nodules in the left lower lobe with more confluent airspace infiltrates 

in the right lower lobe, new since the prior exam.


Again seen are borderline mediastinal lymph nodes. The heart is normal in size 

without pericardial effusion. Atherosclerosis without thoracic aortic aneurysm.


No visualized acute upper abdominal or osseous abnormality.


 


IMPRESSION:  Right suprahilar mass has increased in size since the prior exam, 

currently measuring up to 6.5 cm. There is direct invasion of the right mainstem

bronchus, which is focally narrowed. Findings are again consistent with a 

primary pulmonary neoplasm.


New airspace infiltrates in the right lower lobe and scattered ill-defined 

nodules in the left lower lobe could reflect hemorrhage, infection, or 

endobronchial spread of tumor.


 


Brief History of Present Illness: 


78yo M, PMH: CAD s/p PCI (~>10yrs ago), COPD, HTN, HLD, recently diagnosed with 

non-small cell carcinoma of right lung.


Presents to ED with progessively worsening shortness of breath over 2-3 days. 

Patient has had shortness of breath and dyspnea on exertion for several weeks, 

with occasional hemoptysis. ~2 days prior to admission, wife came home and found

him to be very dyspneic and tachypneic, with some confusion. They obtained 

oxygen from his brother and had significant and quick improvement within an hour

or so. He was then brought to the ED for further evaluation. 


He reports 6-9 months of recurrent episodes of shortness of breath / pneumonias.

He eventually had imaging that was concerning for lung cancer. He underwent 

bronchoscopy on 7/26/22 which just resulted. 


In the ED, he was placed on 4L nasal cannula with mild labored respiratiosn. CT 

Chest noted R lung mass with invasion into right mainstem, causing 90% 

occlusion. Also noted new airspace infiltrates in the right lower lobe and 

scattered ill-defined nodes. Labwork notable for leukocytosis. He has been 

afebrile.





Hospital Course: 





Problem List:


acute hypoxic respiratory failure secondary R lung cancer, 90% narrrowing of R 

mainstem bronchus


Right lower lobe pneumonia suspected


hemoptysis


CKD3


Hypertension


Hyperlipidemia


CAD s/p PCI (>10yrs ago) on aspirin/plavix








patient was admitted and treated empirically for suspected post-obstructive 

pneumonia with antibiotics.


Pulmonology was consulted, and patient was switched to PO levaquin and 20mg BID 

prednisone, along with nebulizers


Pulmonology recommended transfer to tertiary care center for evaluation for 

stent placement in right bronchus, as mass has advanced in a short amount of 

time.


While awaiting for a bed, patient's respiratory status has remained stable ~2-3 

L NC, but affected by anxiety. Patient has had increased anxiety while waiting 

and feeling short of breath, leading to tachypnea and temporarily placed on 4 L 

NC.


Home xanax dose have been given and received 1 dose of 0.5mg IV ativan which 

greatly improved his anxiety, lowering his respiratory rate, and oxygen 

requirement.


He has been having small amount of hemoptysis for several weeks, aspirin and 

plavix were discontinued ~2.5 weeks ago by pulmonology prior to biopsy, and was 

recommended to remain off these medications by pulmonology.


renal function was stable and at ~baseline. He reported his most recent GFR: 40.

Follows with Dr. Landry. Nephrology was following.














Physical Exam:


Gen: fatigued appearing, aox3


CV: regular rate/rhythm, no edema


Pulm: diminished with crackles at R base, clear on left, hoarse voice, mild 

labored respirations on 3L NC


Abd: soft, nontender, non-distended


Neuro: moves all extremities, normal affect, str 5/5 bilateral upper/lower 

extremities





Vital Signs/Physical Exam: 














Temp Pulse Resp BP Pulse Ox


 


 97.2 F   91 H  27 H  169/89 H  96 


 


 08/06/22 08:00  08/06/22 08:00  08/06/22 08:00  08/06/22 08:00  08/06/22 08:00








Laboratory Data at Discharge: 














WBC  25.2 K/uL (4.3-10.9)  H*  08/05/22  05:28    


 


Hgb  13.4 g/dL (13.6-17.9)  L  08/05/22  05:28    


 


Hct  40.3 % (39.6-49.0)   08/05/22  05:28    


 


Plt Count  359 K/uL (152-406)   08/05/22  05:28    


 


PT  12.2 SECONDS (9.5-12.5)   08/02/22  20:52    


 


INR  1.11   08/02/22  20:52    


 


APTT  28.3 SECONDS (24.3-36.9)   08/02/22  20:52    


 


Sodium  140 mmol/L (136-145)   08/05/22  05:28    


 


Potassium  4.1 mmol/L (3.5-5.1)   08/05/22  05:28    


 


BUN  42 mg/dL (7-18)  H  08/05/22  05:28    


 


Creatinine  1.88 mg/dL (0.55-1.3)  H  08/05/22  05:28    


 


Glucose  148 mg/dL ()  H  08/05/22  05:28    


 


Uric Acid  7.0 mg/dL (3.5-7.2)   08/05/22  05:28    


 


Phosphorus  3.5 mg/dL (2.5-4.9)   08/05/22  05:28    


 


Magnesium  2.2 mg/dL (1.8-2.4)   08/05/22  05:28    


 


Total Bilirubin  0.3 mg/dL (0.2-1.0)   08/05/22  05:28    


 


AST  26 U/L (15-37)   08/05/22  05:28    


 


ALT  37 U/L (12-78)   08/05/22  05:28    


 


Alkaline Phosphatase  113 U/L ()   08/05/22  05:28    








Home Medications: 








Aspirin Chewable [Aspirin Chewable*] 81 mg PO DAILY 01/03/20 


Atorvastatin Calcium [Lipitor] 80 mg PO BEDTIME 01/03/20 


Ezetimibe [Zetia] 10 mg PO DAILY 01/03/20 


Lisinopril [Zestril] 2.5 mg PO DAILY 01/03/20 


Albuterol Sulfate [Proair Hfa] 2 puff IH PRN PRN 07/21/22 


Amlodipine Besylate [Norvasc] 2.5 mg PO DAILY 07/21/22 


Budesonide/Glycopyr/Formoterol [Breztri Aerosphere Inhaler] 2 puff IH BID 

07/21/22 


Cetirizine HCl [Zyrtec] 10 mg PO DAILY 07/21/22 


Guaifenesin [Mucinex] 1,200 mg PO DAILY 07/21/22 


Tamsulosin HCl [Flomax] 0.4 mg PO BEDTIME 07/21/22 





Followup: 


Unknown,U [Primary Care Provider] - 


Time spent managing pt's care (in minutes): 45

## 2022-08-07 NOTE — P.PN
Date of Service: 08/05/22


Vital Signs











Temp Pulse Resp BP Pulse Ox


 


 96.9 F   93 H  32 H  127/70   98 


 


 08/06/22 12:00  08/06/22 12:00  08/06/22 12:00  08/06/22 12:00  08/06/22 12:00





Microbiology Results





08/02/22 20:52   Blood  - Blood   Aerobic Blood Culture - Preliminary


                            No growth in 24 hours.


08/02/22 20:52   Blood  - Blood   Anaerobic Blood Culture - Preliminary


                            No growth in 24 hours.


08/02/22 20:52   Blood  - Blood   Aerobic Blood Culture - Preliminary


                            No growth in 24 hours.


08/02/22 20:52   Blood  - Blood   Anaerobic Blood Culture - Preliminary


                            No growth in 24 hours.





Assessment/ Plan: 


Nephrology





Intermittent dyspnea and cough


No chest pain


No acute events overnight





Vitals, medications, blood work and imaging reviewed in the chart.


General: Oriented x3, Cooperative


HEENT: Atraumatic


Neck: Supple


Cardiovascular: No edema, Regular rate/rhythm


Gastrointestinal: Non-distended


Musculoskeletal: No clubbing, No contractures


Integumentary: No rashes, No cyanosis


Neurological: Normal speech





Blood work reviewed in the chart.





Imagings Data: 


EXAM DESCRIPTION:  CT - Thorax Wo Con - 8/3/2022 6:27 am


CLINICAL HISTORY:  77 years   Male   cough, hemoptysis, dyspnea


TECHNIQUE:  Contiguous axial images obtained through the chest without IV 

contrast administration.   Coronal and sagittal reformatted images provided.


This CT exam was performed according to our departmental dose-optimization 

program, which includes one or more of the following dose reduction techniques: 

automated exposure control, adjustment of the mA and/or kV according to patient 

size, and/or use of iterative reconstruction technique.


COMPARISON:  7/8/2022


FINDINGS:  Again seen is moderate diffuse emphysema. Also again seen is a right 

suprahilar mass which has increased in size since the prior exam, measuring 5.1 

x 6.5 x 5.1 cm where it previously measured 4.5 x 5.8 x 4.0 cm.


Again seen is direct invasion of the right mainstem bronchus. This appears more 

pronounced than on the prior exam. There is now focal moderate to severe 

narrowing of the right mainstem bronchus due to this mass. Secretions versus 

blood products noted in the right lower lobe airways. There are scattered ill-

defined nodules in the left lower lobe with more confluent airspace infiltrates 

in the right lower lobe, new since the prior exam.


Again seen are borderline mediastinal lymph nodes. The heart is normal in size 

without pericardial effusion. Atherosclerosis without thoracic aortic aneurysm.


No visualized acute upper abdominal or osseous abnormality.


IMPRESSION:  Right suprahilar mass has increased in size since the prior exam, 

currently measuring up to 6.5 cm. There is direct invasion of the right mainstem

bronchus, which is focally narrowed. Findings are again consistent with a 

primary pulmonary neoplasm.


New airspace infiltrates in the right lower lobe and scattered ill-defined 

nodules in the left lower lobe could reflect hemorrhage, infection, or 

endobronchial spread of tumor.








EXAM DESCRIPTION:  US - Renal Ultrasound-Complete - 8/3/2022 1:12 pm


CLINICAL HISTORY:  renal failure


COMPARISON:  No comparisons


FINDINGS:  The right kidney measures 9.7 x 5.5 x 4.1 cm.  The left kidney 

measures 10.0 x 5.0 x 4.3 cm. Left renal cortex appears slightly thinned 

relative to the right but probably still falls within the range of normal. There

is an increase in renal cortical echogenicity typical for medical renal disease.

No hydronephrosis or suspicious renal mass. A 1.7 centimeter thin-walled 

homogeneous anechoic to hypoechoic exophytic cysts present lateral lower pole 

right kidney a 1.6 centimeter thin-walled anechoic exophytic cyst is present 

upper pole right kidney. A 4 mm nonshadowing hyperechoic focus present in the 

lower central right renal hilum could be nonshadowing, nonobstructing stone, 

prominent hilum fat or a small angiomyolipoma. This is not regarded as 

significant.


No bladder wall thickening or mass. No intraluminal stone or mass.


IMPRESSION:  No hydronephrosis or suspicious mass of either kidney. Increased 

cortical echogenicity is present typical for medical renal disease.


Two benign-appearing exophytic right renal cysts are present largest at 1.7 cm.


No other significant findings.








EXAM DESCRIPTION:  RAD - Chest Single View - 8/2/2022 9:41 pm


CLINICAL HISTORY:  dyspnea


Chest pain.


COMPARISON:  Chest Single View dated 7/26/2022; Thorax Wo Con dated 7/8/2022; 

Small Bowel Series dated 2/18/2020


FINDINGS:  Portable technique limits examination quality.


3 cm right parahilar mass lesion is again noted. Bilateral interstitial 

prominence is seen with increased basilar lung markings present. This may 

represent mild pulmonary edema or a viral infection. The heart is mildly 

enlarged in size.





Conclusions/Impression: 





CKD III with proteinuria


-No NSAIDs





HTN with CKD


-Continue Amlodipine





IFG   A1C 5.9


-No sugar diet





Moderate malnutrition in the setting of NSCLC


-Encourage nutrition


-Protein supplementation as needed





Anemia in chronic illness


-Monitor H&H





CKD MBD   


-Consider Vitamin D





Case reviewed with Dr. Hernandez

## 2022-08-08 NOTE — ECHO
HEIGHT: 5 ft 10 in   WEIGHT: 165 lb 1.6 oz   DATE OF STUDY: 08/05/2022   REFER DR: 
Thierry Villarreal MD

2-DIMENSIONAL: YES

     M.MODE: YES

 DOPPLER: YES

COLOR FLOW: YES



                    TDS:  

PORTABLE: YES

 DEFINITY:  

BUBBLE STUDY: 





DIAGNOSIS:  RESPIRATORY DISTRESS



CARDIAC HISTORY:  

CATHERIZATION: YES

SURGERY: NO

PROSTHETIC VALVE: NO

PACEMAKER: NO





MEASUREMENTS (cm)

    DIASTOLIC (NORMALS)                 SYSTOLIC (NORMALS)

IVSd                 1.2 (0.6-1.2)                    LA Diam 2.1 (1.9-4.0)     LVEF       
  63%  

LVIDd               3.1 (3.5-5.7)                        LVIDs      2.1 (2.0-3.5)     %FS  
        33%

LVPWd             1.3 (0.6-1.2)

Ao Diam           2.5 (2.0-3.7)



2 DIMENSIONAL ASSESSMENT:

RIGHT ATRIUM:                   NORMAL

LEFT ATRIUM:       NORMAL



RIGHT VENTRICLE:            NORMAL

LEFT VENTRICLE: NORMAL



TRICUSPID VALVE:             NORMAL

MITRAL VALVE:     NORMAL



PULMONIC VALVE:             NORMAL

AORTIC VALVE:     NORMAL



PERICARDIAL EFFUSION: NONE

AORTIC ROOT:      NORMAL





LEFT VENTRICULAR WALL MOTION:     NORMAL



DOPPLER/COLOR FLOW:     NORMAL



COMMENTS:      NORMAL LEFT VENTRICULAR EJECTION FRACTION 60-65%.  NORMAL WALL MOTION.  

                            MILD DIASTOLIC DYSFUNCTION.



TECHNOLOGIST:   HELEN SANCHEZ

## 2022-08-16 ENCOUNTER — HOSPITAL ENCOUNTER (EMERGENCY)
Dept: HOSPITAL 97 - ER | Age: 77
Discharge: HOME | End: 2022-08-16
Payer: COMMERCIAL

## 2022-08-16 VITALS — SYSTOLIC BLOOD PRESSURE: 146 MMHG | OXYGEN SATURATION: 99 % | DIASTOLIC BLOOD PRESSURE: 72 MMHG

## 2022-08-16 VITALS — TEMPERATURE: 98.3 F

## 2022-08-16 DIAGNOSIS — C34.90: ICD-10-CM

## 2022-08-16 DIAGNOSIS — R05.9: Primary | ICD-10-CM

## 2022-08-16 DIAGNOSIS — Z88.0: ICD-10-CM

## 2022-08-16 DIAGNOSIS — Z95.5: ICD-10-CM

## 2022-08-16 LAB
BUN BLD-MCNC: 20 MG/DL (ref 7–18)
GLUCOSE SERPLBLD-MCNC: 116 MG/DL (ref 74–106)
HCT VFR BLD CALC: 38.8 % (ref 39.6–49)
LYMPHOCYTES # SPEC AUTO: 1.1 K/UL (ref 0.7–4.9)
MCV RBC: 91.6 FL (ref 80–100)
PMV BLD: 7.8 FL (ref 7.6–11.3)
POTASSIUM SERPL-SCNC: 3.6 MMOL/L (ref 3.5–5.1)
RBC # BLD: 4.24 M/UL (ref 4.33–5.43)

## 2022-08-16 PROCEDURE — 80048 BASIC METABOLIC PNL TOTAL CA: CPT

## 2022-08-16 PROCEDURE — 36415 COLL VENOUS BLD VENIPUNCTURE: CPT

## 2022-08-16 PROCEDURE — 85025 COMPLETE CBC W/AUTO DIFF WBC: CPT

## 2022-08-16 PROCEDURE — 71046 X-RAY EXAM CHEST 2 VIEWS: CPT

## 2022-08-16 NOTE — EDPHYS
Physician Documentation                                                                           

 [facility]                                                                 

Name: Radha Bearden III                                                                            

Age: 77 yrs                                                                                       

Sex: Male                                                                                         

: 1945                                                                                   

MRN: [id number]                                                                                   

Arrival Date: 2022                                                                          

Time: 14:33                                                                                       

Account#: [id number]                                                                            

Bed 13                                                                                            

Private MD:                                                                                       

ED Physician Raul Ford                                                                         

HPI:                                                                                              

                                                                                             

14:59 This 77 yrs old Male presents to ER via Wheelchair with complaints of Breathing         ms3 

      Difficulty.                                                                                 

14:59 The patient or guardian reports cough, that is intermittent, Sputum too thick to cough  ms3 

      up. Onset: The symptoms/episode began/occurred 2 day(s) ago. Severity of symptoms: At       

      their worst the symptoms were moderate, in the emergency department the symptoms are        

      unchanged. Modifying factors: The symptoms are alleviated by nothing, the symptoms are      

      aggravated by nothing. Associated signs and symptoms: The patient has no apparent           

      associated signs or symptoms. Patient had bronchial stent placed and CA cauterized at       

      [facility]. Patient was discharged 1 week ago..                                                  

                                                                                                  

Historical:                                                                                       

- Allergies:                                                                                      

14:43 PENICILLINS;                                                                            hb  

- PMHx:                                                                                           

14:43 squamous cell carcinoma;                                                                hb  

- PSHx:                                                                                           

14:43 14 heart stents;                                                                        hb  

                                                                                                  

- Immunization history:: Adult Immunizations up to date.                                          

- Social history:: Smoking status: unknown.                                                       

                                                                                                  

                                                                                                  

ROS:                                                                                              

14:59 Constitutional: Negative for fever, and chills. Eyes: Negative for injury, pain,        ms3 

      redness, and discharge, Neck: Negative for injury, pain, and swelling, Cardiovascular:      

      Negative for chest pain, and palpitations.                                                  

14:59 Respiratory: Positive for cough.                                                            

14:59 All other systems are negative.                                                             

                                                                                                  

Exam:                                                                                             

14:59 Constitutional:  This is a well developed, well nourished patient who is awake, alert,  ms3 

      and in no acute distress. Neck:  Trachea midline, no cervical lymphadenopathy.  Supple,     

      full range of motion without nuchal rigidity, or vertebral point tenderness.  No            

      Meningismus. Chest/axilla:  Normal chest wall appearance and motion.  Nontender with no     

      deformity.   Cardiovascular:  Regular rate and rhythm with a normal S1 and S2.  No          

      gallops, murmurs, or rubs.  Normal PMI, no JVD.  No pulse deficits. Respiratory:  Lungs     

      have equal breath sounds bilaterally, clear to auscultation and percussion.  No rales,      

      rhonchi or wheezes noted.  No increased work of breathing, no retractions or nasal          

      flaring. Abdomen/GI:  Soft, non-tender, with normal bowel sounds.  No distension or         

      tympany.  No guarding or rebound.  No evidence of tenderness throughout. Skin:  Warm,       

      dry with normal turgor.  Normal color with no rashes, no lesions, and no evidence of        

      cellulitis. MS/ Extremity:  Pulses equal, no cyanosis.  Neurovascular intact.  Full,        

      normal range of motion. Psych:  Awake, alert, with orientation to person, place and         

      time.  Behavior, mood, and affect are within normal limits.                                 

                                                                                                  

Vital Signs:                                                                                      

14:39  / 55; Pulse 88; Resp 24; Temp 98.3; Pulse Ox 96% on 4 lpm NC; Pain 2/10;         hb  

16:30  / 86; Pulse 90; Resp 18 S; Pulse Ox 98% on R/A;                                  jg9 

17:15  / 72; Pulse 90; Resp 22 S; Pulse Ox 99% on 4 lpm NC;                             jg9 

                                                                                                  

MDM:                                                                                              

14:50 Patient medically screened.                                                             ms3 

14:59 Differential Diagnosis: Bronchitis Upper Respiratory Infection Pneumonia Other          ms3 

      Dehydration.                                                                                

22:03 Data reviewed: vital signs, nurses notes, lab test result(s), radiologic studies, plain ms3 

      films. Counseling: I had a detailed discussion with the patient and/or guardian             

      regarding: the historical points, exam findings, and any diagnostic results supporting      

      the discharge/admit diagnosis, lab results, radiology results, the need for outpatient      

      follow up. Special discussion: I discussed with the patient/guardian in detail that at      

      this point there is no indication for admission to the hospital. It is understood,          

      however, that if the symptoms persist or worsen the patient needs to return immediately     

      for re-evaluation. ED course: Patient is improved, in NAD, non-toxic appearing,             

      speaking full sentences..                                                                   

                                                                                                  

                                                                                             

14:54 Order name: Basic Metabolic Panel; Complete Time: 17:26                                 ms3 

                                                                                             

14:54 Order name: CBC with Diff; Complete Time: 17:26                                         ms3 

                                                                                             

14:54 Order name: IV Saline Lock; Complete Time: 17:09                                        ms3 

                                                                                             

14:54 Order name: Chest Pa And Lat (2 Views) XRAY; Complete Time: 16:08                       ms3 

                                                                                             

16:23 Order name: Labs - recollect needed: i forgot which one but i think it's the purple;    iw  

      Complete Time: 17:09                                                                        

                                                                                                  

Administered Medications:                                                                         

No medications were administered                                                                  

                                                                                                  

                                                                                                  

Disposition Summary:                                                                              

22 17:36                                                                                    

Discharge Ordered                                                                                 

      Location: Home                                                                          ms3 

      Problem: new                                                                            ms3 

      Symptoms: are unchanged                                                                 ms3 

      Condition: Stable                                                                       ms3 

      Diagnosis                                                                                   

        - Cough                                                                               ms3 

        - Lung cancer                                                                         ms3 

      Followup:                                                                               ms3 

        - With: Private Physician                                                                  

        - When: 1 - 2 days                                                                         

        - Reason: Recheck today's complaints                                                       

      Discharge Instructions:                                                                     

        - Discharge Summary Sheet                                                             ms3 

        - Cool Mist Vaporizer                                                                 ms3 

        - Cough, Adult, Easy-to-Read                                                          ms3 

      Forms:                                                                                      

        - Medication Reconciliation Form                                                      ms3 

        - Thank You Letter                                                                    ms3 

        - Antibiotic Education                                                                ms3 

        - Prescription Opioid Use                                                             ms3 

      Prescriptions:                                                                              

        - Tessalon Perles 100 mg Oral Capsule                                                      

            - take 1 capsule by ORAL route every 8 hours As needed; 15 capsule; Refills: 0,   ms3 

      Product Selection Permitted                                                                 

Signatures:                                                                                       

Dispatcher [employer]                           Kasia Dias RN RN   iw                                                   

Jennyfer Betts RN RN hb Sims, Marcus,                         DO   ms3                                                  

Margot Crow RN                   RN   jg9                                                  

                                                                                                  

**************************************************************************************************

## 2022-08-16 NOTE — RAD REPORT
EXAM DESCRIPTION:  Gamalt Pa And Lat (2 Views)8/16/2022 3:12 pm

 

CLINICAL HISTORY:  Cough

 

COMPARISON:  August 6, 2022

 

FINDINGS:  Right suprahilar mass is again demonstrated.

 

Mild left basilar opacity is unchanged

 

Right bronchial stent in place.

 

Heart is normal size

## 2022-08-16 NOTE — ER
Nurse's Notes                                                                                     

 [facility]                                                                 

Name: Radha Bearden III                                                                            

Age: 77 yrs                                                                                       

Sex: Male                                                                                         

: 1945                                                                                   

MRN: [id number]                                                                                   

Arrival Date: 2022                                                                          

Time: 14:33                                                                                       

Account#: [id number]                                                                            

Bed 13                                                                                            

Private MD:                                                                                       

Diagnosis: Cough;Lung cancer                                                                      

                                                                                                  

Presentation:                                                                                     

                                                                                             

14:39 Chief complaint: Wife reports he was recently diagnosed with lung CA, bronchial stent   hb  

      with tumor ablation, c/o difficulty managing thick mucous, feels like he is choking         

      sometime, getting worse over the last couple days. On 4LNC home O2. Coronavirus screen:     

      At this time, the client does not indicate any symptoms associated with coronavirus-19.     

      Ebola Screen: No symptoms or risks identified at this time. Risk Assessment: Do you         

      want to hurt yourself or someone else? Patient reports no desire to harm self or            

      others. Onset of symptoms was 2022.                                              

14:39 Method Of Arrival: Wheelchair                                                           hb  

14:39 Acuity: JEFF 3                                                                           hb  

14:40 Initial Sepsis Screen: Does the patient meet any 2 criteria? No. Patient's initial      jg9 

      sepsis screen is negative. Does the patient have a suspected source of infection? No.       

      Patient's initial sepsis screen is negative.                                                

                                                                                                  

Triage Assessment:                                                                                

17:03 General: Appears uncomfortable, Behavior is calm, cooperative.                          jg9 

                                                                                                  

Historical:                                                                                       

- Allergies:                                                                                      

14:43 PENICILLINS;                                                                            hb  

- PMHx:                                                                                           

14:43 squamous cell carcinoma;                                                                hb  

- PSHx:                                                                                           

14:43 14 heart stents;                                                                        hb  

                                                                                                  

- Immunization history:: Adult Immunizations up to date.                                          

- Social history:: Smoking status: unknown.                                                       

                                                                                                  

                                                                                                  

Screenin:36 Abuse screen: Denies threats or abuse. Denies injuries from another. Nutritional        jg9 

      screening: No deficits noted. Tuberculosis screening: No symptoms or risk factors           

      identified. Fall Risk None identified.                                                      

                                                                                                  

Assessment:                                                                                       

17:02 Reassessment: No changes from previously documented assessment. Patient and/or family   jg9 

      updated on plan of care and expected duration. Pain level reassessed. Patient is alert,     

      oriented x 3, equal unlabored respirations, skin warm/dry/pink. Pain: Denies pain.          

                                                                                                  

Vital Signs:                                                                                      

14:39  / 55; Pulse 88; Resp 24; Temp 98.3; Pulse Ox 96% on 4 lpm NC; Pain 2/10;         hb  

16:30  / 86; Pulse 90; Resp 18 S; Pulse Ox 98% on R/A;                                  jg9 

17:15  / 72; Pulse 90; Resp 22 S; Pulse Ox 99% on 4 lpm NC;                             jg9 

                                                                                                  

ED Course:                                                                                        

14:33 Patient arrived in ED.                                                                  mr  

14:36 Raul Ford DO is Attending Physician.                                                ms3 

14:43 Triage completed.                                                                       hb  

14:43 Arm band placed on.                                                                     hb  

15:00 Patient has correct armband on for positive identification. Bed in low position. Call   jg9 

      light in reach. Side rails up X 1.                                                          

15:12 Chest Pa And Lat (2 Views) XRAY In Process Unspecified.                                 EDMS

16:33 Margot Crow, RN is Primary Nurse.                                                 jg9 

17:02 Inserted saline lock: 22 gauge in left forearm, using aseptic technique. Blood          jg9 

      collected.                                                                                  

17:28 IV discontinued, infiltrated.                                                           jg9 

18:02 No provider procedures requiring assistance completed.                                  jg9 

                                                                                                  

Administered Medications:                                                                         

No medications were administered                                                                  

                                                                                                  

                                                                                                  

Medication:                                                                                       

18:02 VIS not applicable for this client.                                                     jg9 

                                                                                                  

Outcome:                                                                                          

17:36 Discharge ordered by MD.                                                                ms3 

18:02 Discharged to home via wheelchair.                                                      jg9 

18:02 Condition: unchanged                                                                        

18:02 Discharge instructions given to patient, Instructed on discharge instructions, follow       

      up and referral plans. Demonstrated understanding of instructions, follow-up care,          

      Prescriptions given X 1.                                                                    

18:03 Patient left the ED.                                                                    jg9 

                                                                                                  

Signatures:                                                                                       

Dispatcher [employer]                           [facility]                                                 

Chey Delaney                                 mr                                                   

BettsJennyfer RN RN                                                      

Raul Ford DO                        DO   ms3                                                  

Margot Crow, ANA                   RN   jg9                                                  

                                                                                                  

**************************************************************************************************

## 2022-08-16 NOTE — XMS REPORT
Continuity of Care Document

                           Created on:2022



Patient:ELEONORA BEARDEN

Sex:Male

:1945

External Reference #:[id number]





Demographics







                          Address                   [address] 



                                                    [place] [address]

 

                          Home Phone                [phone]

 

                          Mobile Phone              [phone]

 

                          Email Address             [email or web address]

 

                          Preferred Language        English

 

                          Marital Status            Unknown

 

                          Restorationism Affiliation     Unknown

 

                          Race                      Unknown

 

                          Additional Race(s)        Unavailable



                                                    White

 

                          Ethnic Group              Unknown









Author







                          Organization              [facility]

t

 

                          Address                   [address]



                                                    [place] [address]

 

                          Phone                     [phone]









Support







                Name            Relationship    Address         Phone

 

                RADHA NIKKY SNYDER               Unavailable     Unavailable

 

                Nikky Bearden               Unavailable     [phone]

 

                Unavailable     Unavailable     Unavailable     Unavailable









Care Team Providers







                    Name                Role                Phone

 

                    [id number]               Primary Care Physician Unavailable

 

                    SYSTEM, PROVIDER NOT IN Attending Clinician Unavailable

 

                    ROSA DOVER Attending Clinician Unavailable

 

                    JOSELINE CHING   Attending Clinician Unavailable

 

                    MARLEN LEÓN Attending Clinician Unavailable

 

                    JOSELINE CHING   Admitting Clinician Unavailable









Payers







           Payer Name Policy Type Policy Number Effective Date Expiration Date S

yaquelin

 

           MEDICARE A B            [id number] 2011 00:00:00            

 

           [employer]            [id number]  2012 00:00:00            







Problems

This patient has no known problems.



Allergies, Adverse Reactions, Alerts







       Allergy Allergy Status Severity Reaction(s) Onset  Inactive Treating Comm

ents 

Source



       Name   Type                        Date   Date   Clinician        

 

       PENICILL Allergy Active                                     [facility]



       [facility]                                9-20                        [facility]



                                          00:00:                      [address]







Social History







           Social Habit Start Date Stop Date  Quantity   Comments   Source

 

           Sex Assigned At 1945                       [facility]



           Birth      00:00:00   00:00:00                         [facility]







Medications

This patient has no known medications.



Vital Signs







             Vital Name   Observation Time Observation Value Comments     Source

 

             HEIGHT       2022 08:57:00 175.3 cm                  

 

             WEIGHT       2022 08:57:00 73.936 kg                 

 

             HEIGHT       2022 08:57:00 175.3 cm                  

 

             WEIGHT       2022 08:57:00 73.936 kg                 







Procedures

This patient has no known procedures.



Encounters







        Start   End     Encounter Admission Attending Care    Care    Encounter 

Source



        Date/Time Date/Time Type    Type    Clinicians Facility Department ID   

   

 

        2022         Outpatient         SYSTEM, [facility]     [id number]

 MD



        10:45:58                         PROVIDER                         Edy serrano

 

        2022 Inpatient UR      STANISLAW, [facility]    Surgery [id number]

[address] [facility]



        17:51:00 20:02:00                 MRINALINI                         

 

        2022 Outpatient                 [facility]     [id number]

6 [facility]



        00:00:00 23:59:00                                                 Colleg

e



                                                                        of



                                                                        [facility]



                                                                        e

 

        2022 Outpatient                 [facility]     [id number]

9 [facility]



        17:51:00 23:59:00                                                 Colleg

e



                                                                        of



                                                                        [facility]



                                                                        e







Results







           Test Description Test Time  Test Comments Results    Result     Sourc

e



                                                       Comments   

 

           MR, BRAIN, WITH   Unlisted Reason ************************  

          



                      9          for Exam - Click ************************      

      



                      19:19:00   Yes and Enter ************[facility]         

   



                                 Reason Below->No - [facility]ENTER[facility]         

   



                                            ELEONORA BEARDEN            



                                            : 1945 Sex:            



                                            M***********************            



                                            ************************            



                                            *************FINAL            



                                            REPORT PATIENT ID:            



                                            [id number] MR, BRAIN, WITH            



                                            \T\ WITHOUT CONTRAST            



                                            INDICATION: Non-small            



                                            cell lung cancer,            



                                            staging Technique: MRI            



                                            of the brain utilizing            



                                            axial T1, T2, FLAIR,            



                                            GRE, DWI, sagittal T1;            



                                            and postgadolinium            



                                            axial, sagittal, and            



                                            coronal T1-weighted            



                                            images. COMPARISON: None            



                                            FINDINGS:Remote infarcts            



                                            of the cerebellar            



                                            hemispheres. No            



                                            restricted diffusion to            



                                            suggest recent ischemic            



                                            insult. No abnormal            



                                            susceptibility.            



                                            Scattered T2/FLAIR            



                                            hyperintense foci within            



                                            the periventricular and            



                                            subcortical white matter            



                                            are nonspecific,            



                                            however, statistically            



                                            represent chronic            



                                            microvascular ischemic            



                                            changes. No            



                                            hydrocephalus. Orbits            



                                            are within normal            



                                            limits. Opacification of            



                                            the right maxillary            



                                            sinus. Additional            



                                            findings: Left mastoid            



                                            effusion. IMPRESSION: No            



                                            intracranial metastases            



                                            are identified. Signed:            



                                            Bob Webb MDReport            



                                            Verified Date/Time:            



                                            2022 19:19:40            



                                            Electronically signed            



                                            by: BOB WEBB MD on            



                                            2022 07:19 PM            

 

           FINE NEEDLE              Medical Cytology Report            



           ASPIRATION BY 9                     Case: [id number]            



           CLINICIAN  12:09:45              Authorizing Provider:            



                                            Yary Raines MD            



                                            Collected: 2022            



                                            08:59 AM Ordering            



                                            Location: [facility]            



                                            PERIOPERATIVE Received:            



                                            2022 10:02 AM            



                                            SERVICES Pathologist:            



                                            Roly Coffey MD            



                                            Specimen: Lymph Node,            



                                            Subcarinal, Station 7            



                                            LYMPH NODE, SUBCARINAL,            



                                            STATION 7 EBUS FNA BY            



                                            CLINICIAN (CYTOSPINS AND            



                                            CELL BLOCK OF ASPIRATE):            



                                            - NEGATIVE FOR            



                                            EPITHELIAL MALIGNANCY            



                                            Rare lymphocytes and            



                                            histiocytes with            



                                            anthracotic pigment            



                                            present Signing            



                                            Pathologist Direct Phone            



                                            Line:                 



                                            [phone]            



                                            ly signed by Roly Coffey MD on 2022            



                                            at 12:09 PMThe specimen            



                                            is satisfactory for            



                                            evaluation but limited            



                                            by scant              



                                            cellularity.Please see            



                                            cases [id number]            



                                            and [id number], [id number],            



                                            HLD, COPD, CAD s/p            



                                            multiple PCIs (most            



                                            recent ), and            



                                            recently diagnosed lung            



                                            SCC presenting with            



                                            worsening SOB. Found to            



                                            have R bronchus 90%            



                                            stenosis and transferred            



                                            to [facility] for            



                                            interventional            



                                            pulmonology evaluation            



                                            of bronchial stent            



                                            placement. LYMPH NODE,            



                                            SUBCARINAL, STATION 7            



                                            EBUS FNAA. Lymph Node,            



                                            Subcarinal, Station            



                                            7.Received 30 mls in            



                                            cytorich red; prepared 2            



                                            cytospins, cell block            



                                            (A2) (cell block placed            



                                            in formalin at 2:26 pm,            



                                            2022)Performed.            



                                            [facility],            



                                            Department of Pathology,            



                                            [address] [place] [address], Tel            



                                            [phone],            



                                            Department of Pathology,            



                                            [address] [place] [address], Tel            



                                            [phone],            



                                            Department of Pathology,            



                                            [address] [place] [address], Tel            



                                            [phone]            

 

           FINE NEEDLE              Medical Cytology Report            



           ASPIRATION BY 9                     Case: [id number]            



           CLINICIAN  12:02:45              Authorizing Provider:            



                                            Yary Raines MD            



                                            Collected: 2022            



                                            08:52 AM Ordering            



                                            Location: [facility]            



                                            PERIOPERATIVE Received:            



                                            2022 10:00 AM            



                                            SERVICES  Pathologist:            



                                            Roly Coffey MD            



                                            Specimen: Lymph Node,            



                                            Lower Paratracheal,            



                                            Left, Station 4L LYMPH            



                                            NODE, LOWER            



                                            PARATRACHEAL, LEFT,            



                                            STATION 4L EBUS FNA BY            



                                            CLINICIAN (CYTOSPINS AND            



                                            CELL BLOCK OF ASPIRATE):            



                                            - NEGATIVE FOR            



                                            EPITHELIAL MALIGNANCY            



                                            Lymphocytes, few            



                                            histiocytes with            



                                            anthracotic pigment and            



                                            reactive bronchial            



                                            epithelial cells present            



                                            Signing Pathologist            



                                            Direct Phone Line:            



                                            [phone]            



                                            ly signed by Roly Coffey MD on 2022            



                                            at 12:02 PMThe specimen            



                                            is satisfactory for            



                                            evaluation but limited            



                                            by scant cellularity.            



                                            Cytology and cell block            



                                            slides show benign            



                                            bronchial elements, few            



                                            histiocytes and            



                                            scattered polymorphous            



                                            lymphocytes. Please see            



                                            cases [id number]            



                                            and [id number], [id number],            



                                            HLD, COPD, CAD s/p            



                                            multiple PCIs (most            



                                            recent ), and            



                                            recently diagnosed lung            



                                            SCC presenting with            



                                            worsening SOB. Found to            



                                            have R bronchus 90%            



                                            stenosis and transferred            



                                            to [facility] for            



                                            interventional            



                                            pulmonology evaluation            



                                            of bronchial stent            



                                            placement. LYMPH NODE,            



                                            LOWER PARATRACHEAL,            



                                            LEFT, STATION 4L EBUS            



                                            FNAA. Lymph Node, Lower            



                                            Paratracheal, Left,            



                                            Station 4L.Received 30            



                                            mls in cytorich red;            



                                            prepared 2 cytospins,            



                                            cell block (A2) (cell            



                                            block placed in formalin            



                                            at 2:24 pm,            



                                            2022)Performed.            



                                            [facility],            



                                            Department of Pathology,            



                                            [address] [place] [address], Tel            



                                            [phone],            



                                            Department of Pathology,            



                                            [address] [place] [address], Tel            



                                            [phone] [facility],            



                                            Department of Pathology,            



                                            [address] [place] [address], Tel            



                                            [phone]            

 

           FINE NEEDLE              Medical Cytology Report            



           ASPIRATION BY 9                     Case: [id number]            



           CLINICIAN  11:55:51              Authorizing Provider:            



                                            Yary Raines MD            



                                            Collected: 2022            



                                            08:45 AM Ordering            



                                            Location: [facility]            



                                            PERIOPERATIVE Received:            



                                            2022 09:57 AM            



                                            SERVICES Pathologist:            



                                            Roly Coffey MD            



                                            Specimen: Lymph Node,            



                                            Interlobar, Left,            



                                            Station 11L LYMPH NODE,            



                                            INTERLOBAR, LEFT,            



                                            STATION 11L EBUS FNA BY            



                                            CLINICIAN (CYTOSPINS AND            



                                            CELL BLOCK OF ASPIRATE):            



                                            - NEGATIVE FOR            



                                            EPITHELIAL MALIGNANCY            



                                            Rare polymorphous            



                                            lymphocytes and reactive            



                                            bronchial epithelial            



                                            cells present Signing            



                                            Pathologist Direct Phone            



                                            Line:                 



                                            [phone]            



                                            ly signed by Roly Coffey MD on 2022            



                                            at 11:55 AMThe specimen            



                                            is satisfactory for            



                                            evaluation but limited            



                                            by scant cellularity.            



                                            Cell block slides show            



                                            mostly benign bronchial            



                                            elements (bronchial            



                                            epithelial cells,            



                                            cartilage and secretory            



                                            glands) and rare            



                                            scattered polymorphous            



                                            lymphocytes. Please see            



                                            cases [id number]            



                                            and [id number], [id number],            



                                            HLD, COPD, CAD s/p            



                                            multiple PCIs (most            



                                            recent ), and            



                                            recently diagnosed lung            



                                            SCC presenting with            



                                            worsening SOB. Found to            



                                            have R bronchus 90%            



                                            stenosis and transferred            



                                            to [facility] for            



                                            interventional            



                                            pulmonology evaluation            



                                            of bronchial stent            



                                            placement. LYMPH NODE,            



                                            INTERLOBAR, LEFT,            



                                            STATION 11L EBUS FNAA.            



                                            Lymph Node, Interlobar,            



                                            Left, Station            



                                            11L.Received 25 mls in            



                                            cytorich red; prepared 2            



                                            cytospins, cell block            



                                            (A2) (cell block placed            



                                            in formalin at 2:22 pm,            



                                            2022)Performed.            



                                            [facility],            



                                            Department of Pathology,            



                                            [address] [place] [address], Tel            



                                            [phone]            



                                            [facility],            



                                            [address] [place] [address], Tel            



                                            [phone],            



                                            Department of Pathology,            



                                            [address] [place] [address], Tel            



                                            [phone]            

 

           TISSUE EXAM              Surgical Pathology            



                      9                     Report Case: [id number]            



                      09:14:38              Authorizing Provider:            



                                            Yary Raines MD            



                                            Collected: 2022            



                                            08:26 AM Ordering            



                                            Location: [facility]            



                                            PERIOPERATIVE Received:            



                                            2022 11:58 AM            



                                            SERVICES Pathologist:            



                                            Lucía Rodriguez MD            



                                             Specimen: Lung, Right,            



                                            Right main stem tumor A.            



                                            RIGHT MAINSTEM TUMOR,            



                                            ENDOBRONCHIAL BIOPSY: -            



                                            POORLY DIFFERENTIATED            



                                            KERATINIZING SQUAMOUS            



                                            CELL CARCINOMA Signing            



                                            Pathologist Direct Phone            



                                            Line:                 



                                            [phone]            



                                            ly signed by Lucía Rodriguez MD on            



                                            2022 at 9:14            



                                            [id number] AIRWAY            



                                            OBSTRUCTIONA. Lung,            



                                            Right.Received in            



                                            formalin labeled with            



                                            the patient's name,            



                                            medical record number            



                                            and "right mainstem            



                                            tumor" are multiple tan            



                                            soft tissue fragments            



                                            measuring up to 0.3 cm            



                                            in greatest dimension,            



                                            which are submitted in            



                                            toto in A1.SARIKA Pinto, HT            



                                            (ASCP)The interpretation            



                                            of this case included            



                                            the use of            



                                            immunohistochemistry or            



                                            special stains.Control            



                                            Slides Examined:            



                                            In-house known positive            



                                            controls were evaluated            



                                            along with the test            



                                            tissue. These control            



                                            slides run alongside of            



                                            the patients sample show            



                                            appropriate staining.            



                                            Internal positive and            



                                            negative controls when            



                                            available are evaluated            



                                            Immunohistochemistry            



                                            technical testing was            



                                            performed at [facility],            



                                            Pathology Laboratory            



                                            where it was developed            



                                            and its performance            



                                            characteristics were            



                                            determined. It has not            



                                            been cleared or approved            



                                            by the U.S. Food and            



                                            Drug Administration. The            



                                            FDA has determined that            



                                            such clearance or            



                                            approval is not            



                                            necessary. The test is            



                                            used for clinical            



                                            purposes. It should not            



                                            be regarded as            



                                            investigational or for            



                                            research. This            



                                            laboratory is certified            



                                            under the Clinical            



                                            Laboratory Improvement            



                                            Amendments of 1988            



                                            (CLIA-88) as qualified            



                                            to perform high            



                                            complexity clinical            



                                            laboratory testing.            

 

           CT, ABDOMEN   Unlisted Reason ************************      

      



                      9          for Exam - Click ************************      

      



                      01:06:00   Yes and Enter ************[facility]         

   



                                 Reason     - MEDICAL CENTERName:            



                                 Below->NoELEONORA Silva         

   



                                 for        : 1945 Sex:            



                                 enterography?->No M***********************     

       



                                 Will this  ************************            



                                 procedure require *************FINAL           

 



                                 oral       REPORT PATIENT ID:            



                                 contrast?->No [id number] CLINICAL            



                                            HISTORY: Metastatic            



                                            disease evaluation            



                                            FINDINGS: Multiple axial            



                                            images of the abdomen            



                                            and pelvis were            



                                            performed after the            



                                            uncomplicated            



                                            administration of IV            



                                            contrast. Oral contrast            



                                            was not given. This exam            



                                            was performed according            



                                            to our departmental            



                                            dose-optimization            



                                            program, which includes            



                                            automated exposure            



                                            control, adjustment of            



                                            the mA and/or kV            



                                            according to patient            



                                            size and/or use of the            



                                            iterative reconstruction            



                                            technique.            



                                            Comparison:None. Lower            



                                            chest: Bilateral            



                                            dependent atelectasis            



                                            versus scarring. No            



                                            pleural effusion or            



                                            pneumothorax.            



                                            Atherosclerotic coronary            



                                            artery calcification.            



                                            Liver: No significant            



                                            findings. Gallbladder            



                                            and biliary tree: No            



                                            significant findings.            



                                            Spleen: No significant            



                                            findings. Adrenal            



                                            Glands: No significant            



                                            findings. Kidneys and            



                                            ureters: 2 exophytic            



                                            cyst arising from the            



                                            right kidney, the larger            



                                            measuring 1.7 cm.            



                                            Stomach and Duodenum: No            



                                            significant findings.            



                                            Pancreas: No significant            



                                            findings. Bowel:            



                                            Scattered left colonic            



                                            diverticulosis without            



                                            CT evidence for acute            



                                            diverticulitis.            



                                            Appendix: Normal.            



                                            Bladder: No significant            



                                            findings. Major vascular            



                                            structures:            



                                            Atherosclerotic            



                                            calcifications            



                                            Reproductive organs: No            



                                            significant findings.            



                                            Other: No free air,            



                                            fluid or adenopathy            



                                            Skeleton: No lytic or            



                                            blastic skeletal            



                                            lesions. IMPRESSION: No            



                                            CT evidence of            



                                            metastatic disease to            



                                            the abdomen or pelvis.            



                                            Signed: Rusty Lowry            



                                            MDReport Verified            



                                            Date/Time: 2022            



                                            01:06:56 Electronically            



                                            signed by: RUSTY LOWRY M.D. on            



                                            2022 01:06 AM            

 

           RAD, CHEST, 1 2022-0  Reason for ************************         

   



           VIEW, NON DEPT 8          exam:->Post ************************       

     



                      13:30:00   bronchoscopy ************[facility] CENTERName:        

    



                                 this be performed ELEONORA BEARDEN        

    



                                 at the     : 1945  Sex:            



                                 bedside?->Yes M***********************         

   



                                            ************************            



                                            *************FINAL            



                                            REPORT PATIENT ID:            



                                            [id number] Chest AP            



                                            portable COMPARISON            



                                            STUDY: 2019 History            



                                            provided: Status post            



                                            bronchoscopy.            



                                            Pneumothorax evaluation            



                                            Heart size normal.            



                                            Self-expanding metallic            



                                            stent within the right            



                                            mainstem bronchus. Soft            



                                            tissue mass in the right            



                                            suprahilar region. Lungs            



                                            grossly free of acute            



                                            disease and vascularity            



                                            normal. No pneumothorax.            



                                            Signed: Stephanie Barobsa            



                                            MDReport Verified            



                                            Date/Time: 2022            



                                            13:30:46 Reading            



                                            Location: [facility]            



                                            Radiology Reading Room            



                                            Electronically signed            



                                            by: STEPHANIE BARBOSA on            



                                            2022 01:30 PM            









                    FINE NEEDLE ASPIRATE (FNA) REQUEST 2022 12:00:50 









                      Test Item  Value      Reference Range Interpretation Comme

nts









             CYTOLOGY RESULT POINTER (BEAKER) (test code = 2629) See Separate Re

port                           



FINE NEEDLE ASPIRATE (FNA) [id number] 11:01:19





             Test Item    Value        Reference Range Interpretation Comments

 

             CYTOLOGY RESULT POINTER See Separate Report                        

   



             (BEAKER) (test code =                                        



             2629)                                               



FINE NEEDLE ASPIRATE (FNA) [id number] 11:00:23





             Test Item    Value        Reference Range Interpretation Comments

 

             CYTOLOGY RESULT POINTER See Separate Report                        

   



             (BEAKER) (test code =                                        



             2629)                                               



SARS-COV2/RT-PCR ([facility])2022 06:39:58





             Test Item    Value        Reference Range Interpretation Comments

 

             SARS-COV2/RT-PCR Negative     Negative                  The SARS-Co

V-2 target



             (test code =                                        nucleic acids a

re not



             [id number])                                            detected in thi

s specimen.



                                                                 Negative result

s do not



                                                                 preclude SARS-C

oV-2



                                                                 infection and s

hould not be



                                                                 used as the sol

e basis for



                                                                 patient managem

ent



                                                                 decisions. Nega

tive results



                                                                 must be combine

d with



                                                                 clinical observ

ations,



                                                                 patient history

, and



                                                                 epidemiological

 information.



                                                                 A false negativ

e result may



                                                                 occur if a spec

imen is



                                                                 improperly jennifer

ected,



                                                                 transported or 

handled. This



                                                                 SARS CoV-2 test

 is a rapid,



                                                                 real-time RT-PC

R test



                                                                 intended for th

e qualitative



                                                                 detection of nu

cleic acid



                                                                 from SARS-CoV-2

 in a



                                                                 nasopharyngeal 

swab specimen



                                                                 collected from 

individuals



                                                                 suspected of CO

VID-19 by



                                                                 their healthcar

e provider.



This test has been authorized by FDA under an EUA for use by authorized 
laboratories. This test is only authorized for the duration of the declaration 
that circumstances exist justifying the authorization of emergency use of in 
vitro diagnostic tests for detection and/or diagnosis of COVID-19 under Section 
564(b)(1) of the Federal Food, Drug and Cosmetic Act, 21 U.S.C. 360bbb-3(b)(1), 
unless the authorization is terminated or revoked sooner. Fact Sheet for 
Healthcare Providers: https://www.[employer]
m/Documents/Xpert%20Xpress%20SARS%20CoV-2/Fact%20Sheets/302-3802%68SNGO-IAL-2%20

HEALTHCARE%20PROVIDERS%20FACT%20SHEET.pdf Fact Sheet for Healthcare Patients: 
https://www.[employer]/Documents/Xpert%20Xp
ress%20SARS%20CoV-2/Fact%20Sheets/302-3801%12FXNH-UCW-6%20PATIENT%20FACT%20SHEET

.pdfHEPATIC FUNCTION [id number] 05:04:57





             Test Item    Value        Reference Range Interpretation Comments

 

             TOTAL PROTEIN (BEAKER) (test code = 6.3 gm/dL    6.0-8.3           

        



             770)                                                

 

             ALBUMIN (BEAKER) (test code = 1145) 3.2 g/dL     3.5-5.0      L    

        

 

             BILIRUBIN TOTAL (BEAKER) (test code 0.6 mg/dL    0.2-1.2           

        



             = 377)                                              

 

             BILIRUBIN DIRECT (BEAKER) (test 0.3 mg/dL    0.1-0.5               

    



             code = 706)                                         

 

             ALKALINE PHOSPHATASE (BEAKER) (test 103 U/L                  

        



             code = 346)                                         

 

             AST (SGOT) (BEAKER) (test code = 24 U/L       5-34                 

     



             353)                                                

 

             ALT (SGPT) (BEAKER) (test code = 38 U/L       6-55                 

     



             347)                                                



 ID - LAURY MBASIC METABOLIC [id number] 05:04:56





             Test Item    Value        Reference Range Interpretation Comments

 

             SODIUM (BEAKER) 140 meq/L    136-145                   



             (test code = 381)                                        

 

             POTASSIUM    4.0 meq/L    3.5-5.1                   



             (BEAKER) (test                                        



             code = 379)                                         

 

             CHLORIDE (BEAKER) 107 meq/L                        



             (test code = 382)                                        

 

             CO2 (BEAKER) 26 meq/L     22-29                     



             (test code = 355)                                        

 

             BLOOD UREA   34 mg/dL     7-21         H            



             NITROGEN (BEAKER)                                        



             (test code = 354)                                        

 

             CREATININE   1.55 mg/dL   0.57-1.25    H            



             (BEAKER) (test                                        



             code = 358)                                         

 

             GLUCOSE RANDOM 101 mg/dL                        



             (BEAKER) (test                                        



             code = 652)                                         

 

             CALCIUM (BEAKER) 9.0 mg/dL    8.4-10.2                  



             (test code = 697)                                        

 

             EGFR (BEAKER) 47                                      Interpretatio

n of eGFR



             (test code = mL/min/1.73                            values Stage De

scription



             1092)        sq m                                   Result G1 Dacia

l or high



                                                                 >=90 G2 Mildly 

decreased



                                                                 60-89 G3a Mildl

y to



                                                                 moderately 45-5

9 G3b



                                                                 Moderately to s

everely



                                                                 30-44 G4 Severl

y decreased



                                                                 15-29 G5 Kidney

 failure



                                                                 <15Reported eGF

R is based



                                                                 on the CKD-EPI 





                                                                 equation that d

oes not use



                                                                 a race



                                                                 coefficientEsti

mated GFR



                                                                 is not as accur

ate as



                                                                 Creatinine Jacqueline

dhara in



                                                                 predicting glom

erular



                                                                 filtration rate

. Estimated



                                                                 GFR is not appl

icable for



                                                                 dialysis patien

ts



 ID - LAURY [id number] 05:04:56





             Test Item    Value        Reference Range Interpretation Comments

 

             MAGNESIUM (BEAKER) (test code = 2.4 mg/dL    1.6-2.6               

    



             627)                                                



 ID - LAUYR [id number] 05:04:56





             Test Item    Value        Reference Range Interpretation Comments

 

             PHOSPHORUS (BEAKER) (test code = 2.9 mg/dL    2.3-4.7              

     



             604)                                                



 ID - LAURY MPROTHROMBIN [id number] 04:46:07





             Test Item    Value        Reference Range Interpretation Comments

 

             PROTIME (BEAKER) 14.9 seconds 11.9-14.2    H            



             (test code = 759)                                        

 

             INR (BEAKER) (test 1.25         See_Comment                [Automat

ed message]



             code = 370)                                         The system [employer]



                                                                 generated this 

result



                                                                 transmitted ref

erence



                                                                 range: <=5.90. 

The



                                                                 reference range

 was



                                                                 not used to int

erpret



                                                                 this result as



                                                                 normal/abnormal

.



RECOMMENDED COUMADIN/WARFARIN INR THERAPY RANGESSTANDARD DOSE: 2.0 - 3.0 
Includes: PROPHYLAXIS for venous thrombosis, systemic embolization; TREATMENT 
for venous thrombosis and/or pulmonary embolus.HIGH RISK: Target INR is 2.5-3.5 
for patients with mechanical heart valves.CBC W/PLT COUNT &amp; AUTO 
[id number] 04:36:19





             Test Item    Value        Reference Range Interpretation Comments

 

             WHITE BLOOD CELL COUNT (BEAKER) 16.7 K/ L    3.5-10.5     H        

    



             (test code = 775)                                        

 

             RED BLOOD CELL COUNT (BEAKER) 4.48 M/ L    4.63-6.08    L          

  



             (test code = 761)                                        

 

             HEMOGLOBIN (BEAKER) (test code = 12.9 GM/DL   13.7-17.5    L       

     



             410)                                                

 

             HEMATOCRIT (BEAKER) (test code = 40.1 %       40.1-51.0            

     



             411)                                                

 

             MEAN CORPUSCULAR VOLUME (BEAKER) 89.5 fL      79.0-92.2            

     



             (test code = 753)                                        

 

             MEAN CORPUSCULAR HEMOGLOBIN 28.8 pg      25.7-32.2                 



             (BEAKER) (test code = 751)                                        

 

             MEAN CORPUSCULAR HEMOGLOBIN CONC 32.2 GM/DL   32.3-36.5    L       

     



             (BEAKER) (test code = 752)                                        

 

             RED CELL DISTRIBUTION WIDTH 17.7 %       11.6-14.4    H            



             (BEAKER) (test code = 412)                                        

 

             PLATELET COUNT (BEAKER) (test 323 K/CU MM  150-450                 

  



             code = 756)                                         

 

             MEAN PLATELET VOLUME (BEAKER) 10.1 fL      9.4-12.4                

  



             (test code = 754)                                        

 

             NUCLEATED RED BLOOD CELLS 0 /100 WBC   0-0                       



             (BEAKER) (test code = 413)                                        

 

             NEUTROPHILS RELATIVE PERCENT 80 %                                  

 



             (BEAKER) (test code = 429)                                        

 

             LYMPHOCYTES RELATIVE PERCENT 12 %                                  

 



             (BEAKER) (test code = 430)                                        

 

             MONOCYTES RELATIVE PERCENT 8 %                                    



             (BEAKER) (test code = 431)                                        

 

             EOSINOPHILS RELATIVE PERCENT 0 %                                   

 



             (BEAKER) (test code = 432)                                        

 

             BASOPHILS RELATIVE PERCENT 0 %                                    



             (BEAKER) (test code = 437)                                        

 

             NEUTROPHILS ABSOLUTE COUNT 13.31 K/ L   1.78-5.38    H            



             (BEAKER) (test code = 670)                                        

 

             LYMPHOCYTES ABSOLUTE COUNT 1.94 K/ L    1.32-3.57                 



             (BEAKER) (test code = 414)                                        

 

             MONOCYTES ABSOLUTE COUNT (BEAKER) 1.26 K/ L    0.30-0.82    H      

      



             (test code = 415)                                        

 

             EOSINOPHILS ABSOLUTE COUNT 0.05 K/ L    0.04-0.54                 



             (BEAKER) (test code = 416)                                        

 

             BASOPHILS ABSOLUTE COUNT (BEAKER) 0.03 K/ L    0.01-0.08           

      



             (test code = 417)                                        

 

             IMMATURE GRANULOCYTES-RELATIVE 1 %          0-1                    

   



             PERCENT (BEAKER) (test code =                                      

  



             2801)                                               



POCT-GLUCOSE [id number] 21:22:37





             Test Item    Value        Reference Range Interpretation Comments

 

             POC-GLUCOSE METER 156 mg/dL           H            : TESTED A

T [facility] [address]



             (BEAKER) (test code =                                        [place],



             1538)                                               [address]:



                                                                 /Techni

malissa ID



                                                                 = [id number] for AR

VERN POWER



RAD, CHEST, 2 [id number] 08:28:00Reason for Exam:-&gt;cad, htn, tia, copd, 
hypercholesterolemiaFINAL REPORT PATIENT ID: [id number] Chest, 2 views. Clinical 
History: cad, htn, tia, copd, hypercholesterolemia Comparison Study: 2016 Findings: The cardiac silhouette is unremarkable. Increased interstitial 
pulmonary markings are seen. A 9 mm nodular opacity is seen in the right lower 
lung field, stable from previous and possibly a nipple shadow. The pleural 
spaces are clear. Degenerative changes are seen. Impression: No significant 
change. Signed: Elie Castro Verified Date/Time: 2019 08:28:10
Reading Location: [facility] Diagnostic Imaging Reading Room - [place] [address] Electro
nically signed by: ELIE CASTRO M.D. on 2019 08:28 AMCBC W/PLT COUNT 
&amp; AUTO [id number] 08:08:00





             Test Item    Value        Reference Range Interpretation Comments

 

             WHITE BLOOD CELL COUNT (BEAKER) 11.4 K/ L    3.5-10.5     H        

    



             (test code = 775)                                        

 

             RED BLOOD CELL COUNT (BEAKER) 4.85 M/ L    4.63-6.08               

  



             (test code = 761)                                        

 

             HEMOGLOBIN (BEAKER) (test code = 13.3 GM/DL   13.7-17.5    L       

     



             410)                                                

 

             HEMATOCRIT (BEAKER) (test code = 43.0 %       40.1-51.0            

     



             411)                                                

 

             MEAN CORPUSCULAR VOLUME (BEAKER) 88.7 fL      79.0-92.2            

     



             (test code = 753)                                        

 

             MEAN CORPUSCULAR HEMOGLOBIN 27.4 pg      25.7-32.2                 



             (BEAKER) (test code = 751)                                        

 

             MEAN CORPUSCULAR HEMOGLOBIN CONC 30.9 GM/DL   32.3-36.5    L       

     



             (BEAKER) (test code = 752)                                        

 

             RED CELL DISTRIBUTION WIDTH 18.0 %       11.6-14.4    H            



             (BEAKER) (test code = 412)                                        

 

             PLATELET COUNT (BEAKER) (test 259 K/CU MM  150-450                 

  



             code = 756)                                         

 

             MEAN PLATELET VOLUME (BEAKER) 10.3 fL      9.4-12.4                

  



             (test code = 754)                                        

 

             NUCLEATED RED BLOOD CELLS 0 /100 WBC   0-0                       



             (BEAKER) (test code = 413)                                        

 

             NEUTROPHILS RELATIVE PERCENT 74 %                                  

 



             (BEAKER) (test code = 429)                                        

 

             LYMPHOCYTES RELATIVE PERCENT 16 %                                  

 



             (BEAKER) (test code = 430)                                        

 

             MONOCYTES RELATIVE PERCENT 8 %                                    



             (BEAKER) (test code = 431)                                        

 

             EOSINOPHILS RELATIVE PERCENT 2 %                                   

 



             (BEAKER) (test code = 432)                                        

 

             BASOPHILS RELATIVE PERCENT 0 %                                    



             (BEAKER) (test code = 437)                                        

 

             NEUTROPHILS ABSOLUTE COUNT 8.48 K/ L    1.78-5.38    H            



             (BEAKER) (test code = 670)                                        

 

             LYMPHOCYTES ABSOLUTE COUNT 1.80 K/ L    1.32-3.57                 



             (BEAKER) (test code = 414)                                        

 

             MONOCYTES ABSOLUTE COUNT (BEAKER) 0.90 K/ L    0.30-0.82    H      

      



             (test code = 415)                                        

 

             EOSINOPHILS ABSOLUTE COUNT 0.18 K/ L    0.04-0.54                 



             (BEAKER) (test code = 416)                                        

 

             BASOPHILS ABSOLUTE COUNT (BEAKER) 0.05 K/ L    0.01-0.08           

      



             (test code = 417)                                        

 

             IMMATURE GRANULOCYTES-RELATIVE 0 %          0-1                    

   



             PERCENT (BEAKER) (test code =                                      

  



             2801)                                               



LIPID [id number] 08:08:00





             Test Item    Value        Reference Range Interpretation Comments

 

             TRIGLYCERIDES (BEAKER) (test code = 143 mg/dL                      

        



             540)                                                

 

             CHOLESTEROL (BEAKER) (test code = 268 mg/dL                        

      



             631)                                                

 

             HDL CHOLESTEROL (BEAKER) (test code 38 mg/dL                       

        



             = 976)                                              

 

             LDL CHOLESTEROL CALCULATED (BEAKER) 201 mg/dL                      

        



             (test code = 633)                                        



Triglyceride Reference Range: Low Risk &lt;150 Borderline 150-199 High Risk 200-
499 Very High Risk &gt;=500Cholesterol Reference Range: Low Risk &lt;200 
Borderline 200-239 High Risk &gt;240HDL Cholesterol Reference Range: Low Risk 
&gt;=60 High Risk &lt;40LDL Cholesterol Reference Range: Optimal &lt;100 Near 
Optimal 100-129 Borderline 130-159 High 160-189 Very High &gt;=190BASIC 
METABOLIC [id number] 08:08:00





             Test Item    Value        Reference Range Interpretation Comments

 

             SODIUM (BEAKER) 139 meq/L    136-145                   



             (test code = 381)                                        

 

             POTASSIUM (BEAKER) 4.4 meq/L    3.5-5.1                   



             (test code = 379)                                        

 

             CHLORIDE (BEAKER) 104 meq/L                        



             (test code = 382)                                        

 

             CO2 (BEAKER) (test 25 meq/L     22-29                     



             code = 355)                                         

 

             BLOOD UREA NITROGEN 20 mg/dL     7-21                      



             (BEAKER) (test code                                        



             = 354)                                              

 

             CREATININE (BEAKER) 1.53 mg/dL   0.57-1.25    H            



             (test code = 358)                                        

 

             GLUCOSE RANDOM 114 mg/dL           H            



             (BEAKER) (test code                                        



             = 652)                                              

 

             CALCIUM (BEAKER) 9.1 mg/dL    8.4-10.2                  



             (test code = 697)                                        

 

             EGFR (BEAKER) (test 45 mL/min/1.73                           ESTIMA

YASMEEN GFR IS



             code = 1092) sq m                                   NOT AS ACCURATE

 AS



                                                                 CREATININE



                                                                 CLEARANCE IN



                                                                 PREDICTING



                                                                 GLOMERULAR



                                                                 FILTRATION RATE

.



                                                                 ESTIMATED GFR I

S



                                                                 NOT APPLICABLE 

FOR



                                                                 DIALYSIS PATIEN

TS.



HEPATIC FUNCTION [id number] 08:08:00





             Test Item    Value        Reference Range Interpretation Comments

 

             TOTAL PROTEIN (BEAKER) (test code = 7.7 gm/dL    6.0-8.3           

        



             770)                                                

 

             ALBUMIN (BEAKER) (test code = 1145) 3.8 g/dL     3.5-5.0           

        

 

             BILIRUBIN TOTAL (BEAKER) (test code 0.5 mg/dL    0.2-1.2           

        



             = 377)                                              

 

             BILIRUBIN DIRECT (BEAKER) (test 0.2 mg/dL    0.1-0.5               

    



             code = 706)                                         

 

             ALKALINE PHOSPHATASE (BEAKER) (test 116 U/L                  

        



             code = 346)                                         

 

             AST (SGOT) (BEAKER) (test code = 18 U/L       5-34                 

     



             353)                                                

 

             ALT (SGPT) (BEAKER) (test code = 15 U/L       6-55                 

     



             347)

## 2022-08-16 NOTE — XMS REPORT
Clinical Summary

                           Created on:2022



Patient:Radha Bearden

Sex:Male

:1945

External Reference #:[id number]





Demographics







                          Address                   [address]



                                                    [place] [address]

 

                          Mobile Phone              [phone]

 

                          Home Phone                [phone]

 

                          Email Address             [email or web address]

 

                          Preferred Language        English

 

                          Marital Status            

 

                          Synagogue Affiliation     Unknown

 

                          Race                      White

 

                          Ethnic Group              Not  or 









Author







                          Organization              [facility]

[facility]

 

                          Address                   [address] [place] [address]









Support







                Name            Relationship    Address         Phone

 

                Nikky Bearden  Unavailable     Unavailable     [phone]









Care Team Providers







                    Name                Role                Phone

 

                    Suzy Ordaz MD    Primary Care Provider [phone]









Allergies

Not on File



Medications

Not on file



Active Problems

Not on file



Social History







             Tobacco Use  Types        Packs/Day    Years Used   Date

 

             Never Assessed                                        









                          Sex Assigned at Birth     Date Recorded

 

                          Not on file               







Last Filed Vital Signs

Not on file



Plan of Treatment







             Date         Type         Specialty    Care Team    Description

 

             2022   NPR          Patient Access Services              

 

                2022      Office Visit    Thoracic Medicine Suzy Ordaz MD



                                        



                                                                [facility] [place], [place] [address]

0



                                        



                                                                [phone] (Wo

rk)



                                        



                                                    [phone] (Fax) 

 

                2022      Office Visit    Thoracic Surgery Randy Foster MD



                                        



                                                                [facility] [place], [place] [address]

0



                                        



                                                                [phone] (Wo

rk)



                                        



                                                    [phone] (Fax) 

 

             2022   Appointment  Radiation Oncology              







Results

Not on fileafter 2021



Insurance







          Payer     Benefit Plan / Subscriber ID Effective Dates Phone     Addre

ss   Type



                    Group                                             

 

          MEDICARE  MEDICARE PART [id number] 2011-Debi [phone] [facility]   [facility]



                      A AND B               t          2          [employer]



                                        



                                                                  [address]



                                        



                                                            [place]      



                                                            [id number] 









           Guarantor Name Account Type Relation to Date of Birth Phone      Bill

ing



                                 Patient                          Address

 

           Radha Bearden Personal/Family Self       1945 [phone] [address]







                                                       (Home)     [facility] [place]



                                                                  [address]

 

           Radha Bearden Personal/Family Self       1945 [phone] [address]







                                                       (Home)     [place], [place]



                                                                  [address]







Care Teams







             Team Member  Relationship Specialty    Start Date   End Date

 

                                        Suzy Ordaz MD



                PCP - General   Thoracic Medicine [address]         



                                        [address] [place] [address]



                                                                



                                        [phone] (Work)



                                                                



             [phone] (Fax)
